# Patient Record
Sex: MALE | Race: WHITE | NOT HISPANIC OR LATINO | Employment: FULL TIME | URBAN - METROPOLITAN AREA
[De-identification: names, ages, dates, MRNs, and addresses within clinical notes are randomized per-mention and may not be internally consistent; named-entity substitution may affect disease eponyms.]

---

## 2019-01-02 ENCOUNTER — LAB (OUTPATIENT)
Dept: LAB | Facility: HOSPITAL | Age: 24
End: 2019-01-02

## 2019-01-02 ENCOUNTER — CONSULT (OUTPATIENT)
Dept: CARDIOLOGY | Facility: CLINIC | Age: 24
End: 2019-01-02

## 2019-01-02 VITALS
HEART RATE: 94 BPM | DIASTOLIC BLOOD PRESSURE: 86 MMHG | HEIGHT: 71 IN | OXYGEN SATURATION: 98 % | WEIGHT: 261.2 LBS | BODY MASS INDEX: 36.57 KG/M2 | SYSTOLIC BLOOD PRESSURE: 142 MMHG

## 2019-01-02 DIAGNOSIS — I49.3 PVC'S (PREMATURE VENTRICULAR CONTRACTIONS): ICD-10-CM

## 2019-01-02 DIAGNOSIS — I49.3 PVC'S (PREMATURE VENTRICULAR CONTRACTIONS): Primary | ICD-10-CM

## 2019-01-02 PROBLEM — R00.2 PALPITATIONS: Status: ACTIVE | Noted: 2019-01-02

## 2019-01-02 LAB
DEPRECATED FTI SERPL-MCNC: 2.1 TBI
T3RU NFR SERPL: 32.8 % (ref 23–37)
T4 SERPL-MCNC: 6.5 MCG/DL (ref 4.7–11.4)
TSH SERPL DL<=0.05 MIU/L-ACNC: 2.25 MIU/ML (ref 0.35–5.35)

## 2019-01-02 PROCEDURE — 84479 ASSAY OF THYROID (T3 OR T4): CPT

## 2019-01-02 PROCEDURE — 99243 OFF/OP CNSLTJ NEW/EST LOW 30: CPT | Performed by: PHYSICIAN ASSISTANT

## 2019-01-02 PROCEDURE — 36415 COLL VENOUS BLD VENIPUNCTURE: CPT

## 2019-01-02 PROCEDURE — 84443 ASSAY THYROID STIM HORMONE: CPT

## 2019-01-02 PROCEDURE — 93000 ELECTROCARDIOGRAM COMPLETE: CPT | Performed by: PHYSICIAN ASSISTANT

## 2019-01-02 PROCEDURE — 84436 ASSAY OF TOTAL THYROXINE: CPT

## 2019-01-02 RX ORDER — FLUTICASONE PROPIONATE 50 MCG
2 SPRAY, SUSPENSION (ML) NASAL AS NEEDED
COMMUNITY
End: 2021-10-25

## 2019-01-02 NOTE — PROGRESS NOTES
"[facility]  INITIAL OFFICE CONSULT      Matthew Pineda  1995  PCP: Saundra Emery PA    SUBJECTIVE:   Matthew Pineda is a 23 y.o. male seen for a consultation visit regarding the following: , Abnormal EKG, PVC's    Chief Complaint:   Abnormal EKG, PVC's    Consultation is requested by ALBANIA Cruz for evaluation of Abnormal ECG; Chest Pain; and Palpitations    Problem List:  1. PVC's  2. Mild Obesity  4. Elevated BP    History:  The patient is a 23-year-old white male seen in consultation today regarding abnormal EKG.  He denies any previous cardiac history.  He states that he has a physically demanding job working for the [employer] disposing of [employer] and [employer].  In order to do this job he has to have a high level of training.  He also requires routine physicals on a regular basis.  He reports that his job sometimes can be physically demanding that also sedentary at times.  He denies any chest pain or shortness of breath with physical activity suggesting angina pectoris.  He denies any syncope events.  He denies any heart failure symptoms.  He states that he presented to his physician at the [employer] for routine physical exam as noted his pulse was \"irregular\".  Patient was placed on a telemtry monitor and this revealed bigeminy PVCs.  He states during his physical exam he is unaware of any extra beats and had no symptoms of palpitations.  He then was referred to his primary care provider performed another EKG revealing normal sinus rhythm no significant arrhythmias.  He states routine lab work was obtained with his primary care provider which we do not have records of.  He states he was told that everything was \"normal\".  He denies having history of high blood pressure buthe has a family history of high blood pressure.  In view of the abnormal EKG revealing PVCs referred to our office for further evaluation.       Cardiac PMH: (Old records have been reviewed and " "summarized below)        Past Medical History, Past Surgical History, Family history, Social History, and Medications were all reviewed with the patient today and updated as necessary.     Current Outpatient Medications   Medication Sig Dispense Refill   • fluticasone (FLONASE) 50 MCG/ACT nasal spray 2 sprays into the nostril(s) as directed by provider As Needed.       No current facility-administered medications for this visit.      No Known Allergies      History reviewed. No pertinent past medical history.  History reviewed. No pertinent surgical history.  Family History   Problem Relation Age of Onset   • Hypertension Father      Social History     Tobacco Use   • Smoking status: Never Smoker   • Smokeless tobacco: Never Used   Substance Use Topics   • Alcohol use: Yes     Comment: weekly       ROS:  Review of Symptoms:  General: no recent weight loss/gain, weakness or fatigue  Skin: no rashes, lumps, or other skin changes  HEENT: no dizziness, lightheadedness, or vision changes  Respiratory: no cough or hemoptysis  Cardiovascular: no palpitations, and tachycardia  Gastrointestinal: no black/tarry stools or diarrhea  Urinary: no change in frequency or urgency  Peripheral Vascular: no claudication or leg cramps  Musculoskeletal: no muscle or joint pain/stiffness  Psychiatric: no depression or excessive stress  Neurological: no sensory or motor loss, no syncope  Hematologic: no anemia, easy bruising or bleeding  Endocrine: no thyroid problems, nor heat or cold intolerance         PHYSICAL EXAM:   Ht 180.3 cm (71\")   Wt 118 kg (261 lb 3.2 oz)   BMI 36.43 kg/m²      Wt Readings from Last 5 Encounters:   01/02/19 118 kg (261 lb 3.2 oz)     BP Readings from Last 5 Encounters:   No data found for BP       General-Well Nourished, Well developed  Eyes - PERRLA  Neck- supple, No mass  CV- regular rate and rhythm, no MRG  Lung- clear bilaterally  Abd- soft, +BS  Musc/skel - Norm strength and range of motion  Skin- warm " and dry  Neuro - Alert & Oriented x 3, appropriate mood.    Medical problems and test results were reviewed with the patient today.     No results found for this or any previous visit.      No results found for: CHOL, HDL, HDLC, LDL, LDLC, VLDL    EKG:  (EKG/Tracing has been independently visualized by me and summarized below)      ECG 12 Lead  Date/Time: 1/2/2019 9:16 PM  Performed by: Yohan Hernandez PA  Authorized by: Yohan Hernandez PA   Comparison: not compared with previous ECG   Rhythm: sinus rhythm  Ectopy: PVCs and unifocal PVCs  Conduction: conduction normal  ST Segments: ST segments normal  T Waves: T waves normal  Other: no other findings  Clinical impression: abnormal ECG            ASSESSMENT   1. Asymptomatic PVC's:  EKG today revealing occasional PVC's.  Tele strips from [facility] revealing Bigeminy .  Patient asymptomatic at that time.   2. Mild Obesity  3. Elevated BP    PLAN  · The patient denies having any symptoms of palpitations.  He denies ever having any syncope events.  He denies any chest pain suggesting angina pectoris.  We will obtain a 24-hour monitor to determine PVC burden.  In addition we will perform echocardiogram to review for any structural heart disease.  · We recommended that he limit caffeine intake improved sleep habits continue diet exercise monitor home blood pressure and limit sodium intake.    · Thyroid panel done today.  Obtain laboratory data from primary care provider.  · Return for follow-up in one month or sooner if any change in symptoms.         Cardiology/Electrophysiology  01/02/19  9:31 AM  Will Mary KIM

## 2019-01-04 ENCOUNTER — TELEPHONE (OUTPATIENT)
Dept: CARDIOLOGY | Facility: CLINIC | Age: 24
End: 2019-01-04

## 2019-01-04 NOTE — TELEPHONE ENCOUNTER
----- Message from ALBANIA García sent at 1/2/2019  9:25 PM EST -----  Need labs form PCP            I called PCP and requested the most recent labs. They should be faxing them today.

## 2019-02-08 ENCOUNTER — OFFICE VISIT (OUTPATIENT)
Dept: CARDIOLOGY | Facility: CLINIC | Age: 24
End: 2019-02-08

## 2019-02-08 ENCOUNTER — HOSPITAL ENCOUNTER (OUTPATIENT)
Dept: CARDIOLOGY | Facility: HOSPITAL | Age: 24
Discharge: HOME | End: 2019-02-08
Admitting: PHYSICIAN ASSISTANT

## 2019-02-08 VITALS
HEART RATE: 101 BPM | SYSTOLIC BLOOD PRESSURE: 140 MMHG | DIASTOLIC BLOOD PRESSURE: 90 MMHG | WEIGHT: 258 LBS | BODY MASS INDEX: 36.12 KG/M2 | OXYGEN SATURATION: 98 % | HEIGHT: 71 IN

## 2019-02-08 VITALS
BODY MASS INDEX: 36.54 KG/M2 | HEIGHT: 71 IN | SYSTOLIC BLOOD PRESSURE: 153 MMHG | WEIGHT: 261 LBS | DIASTOLIC BLOOD PRESSURE: 93 MMHG

## 2019-02-08 DIAGNOSIS — I49.3 PVC'S (PREMATURE VENTRICULAR CONTRACTIONS): ICD-10-CM

## 2019-02-08 DIAGNOSIS — R00.2 PALPITATIONS: ICD-10-CM

## 2019-02-08 DIAGNOSIS — G47.33 OSA (OBSTRUCTIVE SLEEP APNEA): Primary | ICD-10-CM

## 2019-02-08 LAB
BH CV ECHO MEAS - AO MAX PG (FULL): 4.7 MMHG
BH CV ECHO MEAS - AO MAX PG: 7 MMHG
BH CV ECHO MEAS - AO MEAN PG (FULL): 2.5 MMHG
BH CV ECHO MEAS - AO MEAN PG: 3.8 MMHG
BH CV ECHO MEAS - AO ROOT AREA (BSA CORRECTED): 1.3
BH CV ECHO MEAS - AO ROOT AREA: 6.9 CM^2
BH CV ECHO MEAS - AO ROOT DIAM: 3 CM
BH CV ECHO MEAS - AO V2 MAX: 132.5 CM/SEC
BH CV ECHO MEAS - AO V2 MEAN: 87.9 CM/SEC
BH CV ECHO MEAS - AO V2 VTI: 24.4 CM
BH CV ECHO MEAS - ASC AORTA: 2.9 CM
BH CV ECHO MEAS - AVA(I,A): 2.3 CM^2
BH CV ECHO MEAS - AVA(I,D): 2.3 CM^2
BH CV ECHO MEAS - AVA(V,A): 2.3 CM^2
BH CV ECHO MEAS - AVA(V,D): 2.3 CM^2
BH CV ECHO MEAS - BSA(HAYCOCK): 2.5 M^2
BH CV ECHO MEAS - BSA: 2.4 M^2
BH CV ECHO MEAS - BZI_BMI: 36.4 KILOGRAMS/M^2
BH CV ECHO MEAS - BZI_METRIC_HEIGHT: 180.3 CM
BH CV ECHO MEAS - BZI_METRIC_WEIGHT: 118.4 KG
BH CV ECHO MEAS - EDV(CUBED): 123.2 ML
BH CV ECHO MEAS - EDV(TEICH): 116.9 ML
BH CV ECHO MEAS - EF(CUBED): 79.8 %
BH CV ECHO MEAS - EF(TEICH): 72 %
BH CV ECHO MEAS - ESV(CUBED): 24.9 ML
BH CV ECHO MEAS - ESV(TEICH): 32.8 ML
BH CV ECHO MEAS - FS: 41.3 %
BH CV ECHO MEAS - IVS/LVPW: 0.93
BH CV ECHO MEAS - IVSD: 0.81 CM
BH CV ECHO MEAS - LA DIMENSION: 3.3 CM
BH CV ECHO MEAS - LA/AO: 1.1
BH CV ECHO MEAS - LAD MAJOR: 4.6 CM
BH CV ECHO MEAS - LAT PEAK E' VEL: 15.8 CM/SEC
BH CV ECHO MEAS - LATERAL E/E' RATIO: 5.4
BH CV ECHO MEAS - LV MASS(C)D: 144.1 GRAMS
BH CV ECHO MEAS - LV MASS(C)DI: 61 GRAMS/M^2
BH CV ECHO MEAS - LV MAX PG: 2.3 MMHG
BH CV ECHO MEAS - LV MEAN PG: 1.3 MMHG
BH CV ECHO MEAS - LV V1 MAX: 76.5 CM/SEC
BH CV ECHO MEAS - LV V1 MEAN: 52.1 CM/SEC
BH CV ECHO MEAS - LV V1 VTI: 14.3 CM
BH CV ECHO MEAS - LVIDD: 5 CM
BH CV ECHO MEAS - LVIDS: 2.9 CM
BH CV ECHO MEAS - LVOT AREA (M): 3.8 CM^2
BH CV ECHO MEAS - LVOT AREA: 4 CM^2
BH CV ECHO MEAS - LVOT DIAM: 2.2 CM
BH CV ECHO MEAS - LVPWD: 0.87 CM
BH CV ECHO MEAS - MED PEAK E' VEL: 12.3 CM/SEC
BH CV ECHO MEAS - MEDIAL E/E' RATIO: 7
BH CV ECHO MEAS - MV A MAX VEL: 65.6 CM/SEC
BH CV ECHO MEAS - MV DEC TIME: 0.13 SEC
BH CV ECHO MEAS - MV E MAX VEL: 87.4 CM/SEC
BH CV ECHO MEAS - MV E/A: 1.3
BH CV ECHO MEAS - PA ACC SLOPE: 716.4 CM/SEC^2
BH CV ECHO MEAS - PA ACC TIME: 0.14 SEC
BH CV ECHO MEAS - PA MAX PG: 7.3 MMHG
BH CV ECHO MEAS - PA PR(ACCEL): 16.1 MMHG
BH CV ECHO MEAS - PA V2 MAX: 134.7 CM/SEC
BH CV ECHO MEAS - SI(AO): 71.5 ML/M^2
BH CV ECHO MEAS - SI(CUBED): 41.6 ML/M^2
BH CV ECHO MEAS - SI(LVOT): 24 ML/M^2
BH CV ECHO MEAS - SI(TEICH): 35.6 ML/M^2
BH CV ECHO MEAS - SV(AO): 168.9 ML
BH CV ECHO MEAS - SV(CUBED): 98.3 ML
BH CV ECHO MEAS - SV(LVOT): 56.6 ML
BH CV ECHO MEAS - SV(TEICH): 84.1 ML
BH CV ECHO MEAS - TAPSE (>1.6): 2 CM2
BH CV ECHO MEAS - TV MAX PG: 1.2 MMHG
BH CV ECHO MEAS - TV V2 MAX: 55.3 CM/SEC
BH CV ECHO MEASUREMENTS AVERAGE E/E' RATIO: 6.22
BH CV XLRA - RV BASE: 3.4 CM
BH CV XLRA - RV LENGTH: 6.7 CM
BH CV XLRA - RV MID: 2.9 CM
BH CV XLRA - TDI S': 17 CM/SEC
LV EF 2D ECHO EST: 55 %

## 2019-02-08 PROCEDURE — 99213 OFFICE O/P EST LOW 20 MIN: CPT | Performed by: PHYSICIAN ASSISTANT

## 2019-02-08 PROCEDURE — 93306 TTE W/DOPPLER COMPLETE: CPT

## 2019-02-08 PROCEDURE — 93306 TTE W/DOPPLER COMPLETE: CPT | Performed by: INTERNAL MEDICINE

## 2019-04-01 ENCOUNTER — CONSULT (OUTPATIENT)
Dept: SLEEP MEDICINE | Facility: HOSPITAL | Age: 24
End: 2019-04-01

## 2019-04-01 VITALS
OXYGEN SATURATION: 98 % | DIASTOLIC BLOOD PRESSURE: 84 MMHG | BODY MASS INDEX: 35.08 KG/M2 | HEART RATE: 112 BPM | SYSTOLIC BLOOD PRESSURE: 153 MMHG | WEIGHT: 259 LBS | HEIGHT: 72 IN

## 2019-04-01 DIAGNOSIS — E66.9 OBESITY (BMI 30-39.9): ICD-10-CM

## 2019-04-01 DIAGNOSIS — R06.83 SNORING: ICD-10-CM

## 2019-04-01 DIAGNOSIS — G47.10 HYPERSOMNIA: ICD-10-CM

## 2019-04-01 DIAGNOSIS — G47.33 OSA (OBSTRUCTIVE SLEEP APNEA): ICD-10-CM

## 2019-04-01 PROCEDURE — 99244 OFF/OP CNSLTJ NEW/EST MOD 40: CPT | Performed by: INTERNAL MEDICINE

## 2019-04-01 NOTE — PROGRESS NOTES
Matthew Pineda Jr. is a 23 y.o. male.   Chief Complaint   Patient presents with   • Sleeping Problem       HPI     23 y.o. male seen in consultation at the request of Yohan Hernandez PA for evaluation of the above.     He is required to get a monthly medical evaluation at his work.  He had an EKG which revealed frequent PVCs.  He has been completely asymptomatic from the standpoint of palpitations.  He saw cardiology and had a negative workup other than for isolated PVCs.  A history of fatigue and nonrestorative sleep was elicited and he was referred here for a further evaluation.    He does admit to daytime somnolence but says this is much improved recently.  He works varying shifts including night and day shifts and recently moved to be closer to work and is able to get more sleep and thinks this is helped a lot.  He still admits to occasional fatigue/sleepiness.  He has been told he snores loudly at night.  He has had a significant recent weight gain.    Further details are as follows:    Kane Scale is: 2/24    Estimated average amount of sleep per night: 8  Number of times he wakes up at night: 0-1  Difficulty falling back asleep: no  It usually takes 5 minutes to go to sleep.  He feels sleepy upon waking up: no  Rotating or night shift work: yes    Drowsiness/Sleepiness:  He exhibits the following:  none    Snoring/Breathing:  He exhibits the following:  loud snoring  awoken with dry mouth    Reflux:  He describes the following:  none    Narcolepsy:  He exhibits the following:  feeling of paralysis while going to sleep or coming out of sleep    RLS/PLMs:  He describes the following:  discomfort in legs with an urge to move them    Insomnia:  He describes the following:  none    Parasomnia:  He exhibits the following:  none    Weight:  Weight change in the last year:  gain: 30 lbs      The patient's relevant past medical, surgical, family, and social history reviewed and updated in Epic as  "appropriate.    Current medications are:   Current Outpatient Medications:   •  fluticasone (FLONASE) 50 MCG/ACT nasal spray, 2 sprays into the nostril(s) as directed by provider As Needed., Disp: , Rfl:   •  metoprolol tartrate (LOPRESSOR) 25 MG tablet, Take 1 tablet by mouth 2 (Two) Times a Day., Disp: 60 tablet, Rfl: 11.    Review of Systems    Review of Systems  ROS documented in patient questionnaire ×12 systems.  Reviewed with patient.  Otherwise negative except as noted in HPI.    Physical Exam    Blood pressure 153/84, pulse 112, height 181.6 cm (71.5\"), weight 117 kg (259 lb), SpO2 98 %. Body mass index is 35.62 kg/m².    Physical Exam   Constitutional: He is oriented to person, place, and time. He appears well-developed and well-nourished.   HENT:   Head: Normocephalic and atraumatic.   Nose: Nose normal.   Mouth/Throat: Oropharynx is clear and moist. No oropharyngeal exudate.   Large tonsils and class II airway   Eyes: Conjunctivae are normal. No scleral icterus.   Neck: No tracheal deviation present. No thyromegaly present.   Cardiovascular: Normal rate and regular rhythm. Exam reveals no gallop and no friction rub.   No murmur heard.  Pulmonary/Chest: Effort normal. No respiratory distress. He has no wheezes. He has no rales.   Musculoskeletal: He exhibits no edema or deformity.   Neurological: He is alert and oriented to person, place, and time.   Skin: Skin is warm and dry. No rash noted.   Psychiatric: He has a normal mood and affect. His behavior is normal.   Nursing note and vitals reviewed.      ASSESSMENT:    Problem List Items Addressed This Visit        Pulmonary Problems    KELLY (obstructive sleep apnea) - suspected    Snoring       Other    Hypersomnia    Relevant Orders    Home Sleep Study    Obesity (BMI 30-39.9)          Evidence of obstructive sleep apnea based upon snoring and a question of nonrestorative sleep.  He thinks the symptoms have been improved somewhat by getting more sleep at " night but there is still a question of untreated obstructive sleep apnea which I think is valid and particularly salient when you consider his recent unexplained PVCs.    PLAN:    1. Home sleep apnea test  2. I discussed the possible diagnosis of obstructive sleep apnea and its potential effect on his symptoms and potential relationship to his cardiac findings  3. We discussed the diagnostic process as well as potential treatments for obstructive sleep apnea in detail  4. Long-term weight loss recommended  5. Close sleep center follow-up    I have reviewed the results of my evaluation and impression and discussed my recommendations in detail with the patient.      Signed by  Chris Ornelas MD    April 1, 2019      CC: Saundra Emery PA [place]udd[place], William V, PA

## 2021-05-06 ENCOUNTER — HOSPITAL ENCOUNTER (EMERGENCY)
Facility: HOSPITAL | Age: 26
Discharge: HOME | End: 2021-05-06
Attending: EMERGENCY MEDICINE | Admitting: EMERGENCY MEDICINE
Payer: COMMERCIAL

## 2021-05-06 VITALS
WEIGHT: 293.2 LBS | BODY MASS INDEX: 41.97 KG/M2 | RESPIRATION RATE: 17 BRPM | HEART RATE: 83 BPM | HEIGHT: 70 IN | OXYGEN SATURATION: 100 % | DIASTOLIC BLOOD PRESSURE: 89 MMHG | TEMPERATURE: 97.4 F | SYSTOLIC BLOOD PRESSURE: 137 MMHG

## 2021-05-06 DIAGNOSIS — T15.92XA FOREIGN BODY OF LEFT EYE, INITIAL ENCOUNTER: Primary | ICD-10-CM

## 2021-05-06 PROCEDURE — 99283 EMERGENCY DEPT VISIT LOW MDM: CPT

## 2021-05-06 RX ORDER — TETRACAINE HYDROCHLORIDE 5 MG/ML
2 SOLUTION OPHTHALMIC ONCE
Status: COMPLETED | OUTPATIENT
Start: 2021-05-06 | End: 2021-05-06

## 2021-05-06 RX ORDER — ERYTHROMYCIN 5 MG/G
OINTMENT OPHTHALMIC EVERY 6 HOURS
Qty: 3.5 G | Refills: 0 | Status: SHIPPED | OUTPATIENT
Start: 2021-05-06 | End: 2021-05-11

## 2021-05-06 RX ADMIN — TETRACAINE HYDROCHLORIDE 2 DROP: 5 SOLUTION OPHTHALMIC at 18:05

## 2021-05-06 RX ADMIN — FLUORESCEIN SODIUM 1 STRIP: 1 STRIP OPHTHALMIC at 18:05

## 2021-05-06 NOTE — ED PROVIDER NOTES
Subjective   25-year-old male presenting with eye injury.  He states that yesterday he was weed eating, was not wearing protective goggles, felt something fly into his left eye.  All night and today he has had watery discharge from the eye.  Feels like there is something stuck in there.  Denies any vision changes or other complaints.  He does not wear contacts.          Review of Systems   Constitutional: Negative.    HENT: Negative.    Eyes: Positive for pain, discharge and redness. Negative for photophobia and visual disturbance.   Respiratory: Negative.    Cardiovascular: Negative.    Gastrointestinal: Negative.    Genitourinary: Negative.    Musculoskeletal: Negative.    Skin: Negative.    Neurological: Negative.    Psychiatric/Behavioral: Negative.        History reviewed. No pertinent past medical history.    No Known Allergies    History reviewed. No pertinent surgical history.    Family History   Problem Relation Age of Onset   • Hypertension Father        Social History     Socioeconomic History   • Marital status: Single     Spouse name: Not on file   • Number of children: Not on file   • Years of education: Not on file   • Highest education level: Not on file   Tobacco Use   • Smoking status: Never Smoker   • Smokeless tobacco: Never Used   Substance and Sexual Activity   • Alcohol use: Yes     Comment: weekly   • Drug use: No           Objective   Physical Exam  Vitals reviewed.   Constitutional:       General: He is not in acute distress.     Appearance: Normal appearance. He is not ill-appearing, toxic-appearing or diaphoretic.   HENT:      Head: Normocephalic and atraumatic.      Right Ear: External ear normal.      Left Ear: External ear normal.      Nose: Nose normal.      Mouth/Throat:      Mouth: Mucous membranes are moist.      Pharynx: Oropharynx is clear.   Eyes:      Extraocular Movements: Extraocular movements intact.      Pupils: Pupils are equal, round, and reactive to light.      Comments:  Small foreign body left upper eyelid, left conjunctival injection and tearing, no fluorescin uptake    Cardiovascular:      Rate and Rhythm: Normal rate and regular rhythm.      Pulses: Normal pulses.      Heart sounds: Normal heart sounds.   Pulmonary:      Effort: Pulmonary effort is normal. No respiratory distress.      Breath sounds: Normal breath sounds.   Abdominal:      General: Bowel sounds are normal. There is no distension.      Tenderness: There is no abdominal tenderness.   Musculoskeletal:         General: No swelling, tenderness or deformity. Normal range of motion.      Cervical back: Normal range of motion and neck supple.   Skin:     General: Skin is warm and dry.      Capillary Refill: Capillary refill takes less than 2 seconds.      Findings: No rash.   Neurological:      General: No focal deficit present.      Mental Status: He is alert and oriented to person, place, and time.   Psychiatric:         Mood and Affect: Mood normal.         Behavior: Behavior normal.         Foreign Body Removal - Ocular    Date/Time: 5/6/2021 6:21 PM  Performed by: Ney Castle MD  Authorized by: Ney Castle MD     Consent:     Consent obtained:  Verbal    Consent given by:  Patient    Risks discussed:  Bleeding, corneal damage, damage to surrounding structures, infection, incomplete removal, pain, visual impairment and worsening of condition    Alternatives discussed:  No treatment  Location:     Location:  L upper eyelid    Depth:  Superficial  Pre-procedure details:     Imaging:  None    Fluorescein exam: yes      Fluorescein uptake: no    Anesthesia (see MAR for exact dosages):     Local anesthetic:  Tetracaine drops  Procedure details:     Localization method:  Direct visualization and eyelid eversion    Removal mechanism:  Moist cotton swab    Foreign bodies recovered:  1    Description:  Piece of blade of grass    Intact foreign body removal: yes    Post-procedure details:      Confirmation:  No additional foreign bodies on visualization    Dressing:  Antibiotic ointment    Patient tolerance of procedure:  Tolerated well, no immediate complications               ED Course                                           MDM  Number of Diagnoses or Management Options  Foreign body of left eye, initial encounter  Diagnosis management comments: 25-year-old male with eye foreign body.  Well-developed, well-nourished young man in no distress with exam as above.  Did have a small blade of grass in the left upper eyelid that was removed under direct visualization without any complication. Will treat with erythromycin ointment.  We will have him follow-up with orthopedics if not improving.  He is comfortable with and understanding of the plan.    DDx: Eye foreign body      Final diagnoses:   Foreign body of left eye, initial encounter            Ney Castle MD  05/14/21 2343

## 2021-06-18 ENCOUNTER — OFFICE VISIT (OUTPATIENT)
Dept: INTERNAL MEDICINE | Facility: CLINIC | Age: 26
End: 2021-06-18

## 2021-06-18 VITALS
WEIGHT: 273 LBS | BODY MASS INDEX: 39.08 KG/M2 | SYSTOLIC BLOOD PRESSURE: 166 MMHG | DIASTOLIC BLOOD PRESSURE: 104 MMHG | HEIGHT: 70 IN | OXYGEN SATURATION: 98 % | TEMPERATURE: 97.8 F | HEART RATE: 116 BPM

## 2021-06-18 DIAGNOSIS — F41.9 ANXIETY: ICD-10-CM

## 2021-06-18 DIAGNOSIS — Z76.89 ENCOUNTER TO ESTABLISH CARE: Primary | ICD-10-CM

## 2021-06-18 DIAGNOSIS — R03.0 ELEVATED BLOOD PRESSURE READING IN OFFICE WITHOUT DIAGNOSIS OF HYPERTENSION: ICD-10-CM

## 2021-06-18 DIAGNOSIS — N52.8 OTHER MALE ERECTILE DYSFUNCTION: ICD-10-CM

## 2021-06-18 PROCEDURE — 99203 OFFICE O/P NEW LOW 30 MIN: CPT | Performed by: PHYSICIAN ASSISTANT

## 2021-06-18 RX ORDER — BUSPIRONE HYDROCHLORIDE 10 MG/1
10 TABLET ORAL 3 TIMES DAILY PRN
Qty: 90 TABLET | Refills: 2 | Status: SHIPPED | OUTPATIENT
Start: 2021-06-18 | End: 2021-07-12 | Stop reason: SDUPTHER

## 2021-06-18 RX ORDER — CETIRIZINE HYDROCHLORIDE 10 MG/1
10 TABLET ORAL DAILY
COMMUNITY
End: 2023-02-13

## 2021-06-18 RX ORDER — SILDENAFIL CITRATE 20 MG/1
TABLET ORAL
Qty: 30 TABLET | Refills: 1 | Status: SHIPPED | OUTPATIENT
Start: 2021-06-18 | End: 2023-02-13

## 2021-06-18 NOTE — PROGRESS NOTES
New Patient Office Visit      Patient Name: Matthew Pineda Jr.  : 1995   MRN: [id number]     Chief Complaint:    Chief Complaint   Patient presents with   • Establish Care     discuss ED       History of Present Illness: Matthew Pineda Jr. is a 26 y.o. male who is here today to establish care.     For the last 2 weeks he has had persistent erectile dysfunction. He reports he has felt anxious recently due to increased stress with work and buying a new home. No urinary hesitancy, frequency, urgency or dysuria. Similar issue a few years ago which seemed to be anxiety related and symptoms resolved with taking buspirone daily which he continued for over 1 year. He tried Viagra before Buspirone but the Viagra had no major effect.     He has been exercising for the last 3 weeks and has intentionally lost weight. No fatigue, night sweats, fever or adenopathy.      Blood pressure is elevated today. He denies known history of hypertension. He denies chest pain, dyspnea, orthopnea, palpitations, lower extremity edema, headaches, weakness, visual disturbances or confusion. His father has high blood pressure which may have been diagnosed in his 40s.     He has a physical with labs every year for work. He reports labs have been within normal.     Subjective         Past Medical History: History reviewed. No pertinent past medical history.    Past Surgical History: History reviewed. No pertinent surgical history.    Family History:   Family History   Problem Relation Age of Onset   • Hypertension Father        Social History:   Social History     Socioeconomic History   • Marital status: Single     Spouse name: Not on file   • Number of children: Not on file   • Years of education: Not on file   • Highest education level: Not on file   Tobacco Use   • Smoking status: Never Smoker   • Smokeless tobacco: Never Used   Substance and Sexual Activity   • Alcohol use: Yes     Comment: 2 x per week   • Drug use: No  "      Medications:     Current Outpatient Medications:   •  cetirizine (zyrTEC) 10 MG tablet, Take 10 mg by mouth Daily., Disp: , Rfl:   •  fluticasone (FLONASE) 50 MCG/ACT nasal spray, 2 sprays into the nostril(s) as directed by provider As Needed., Disp: , Rfl:   •  busPIRone (BUSPAR) 10 MG tablet, Take 1 tablet by mouth 3 (Three) Times a Day As Needed (anxiety)., Disp: 90 tablet, Rfl: 2  •  sildenafil (REVATIO) 20 MG tablet, Take 2-3 tablets by mouth 0.5-4 hours before needed up to once daily., Disp: 30 tablet, Rfl: 1    Allergies:   No Known Allergies    Objective     Physical Exam:   Vital Signs:   Vitals:    06/18/21 1049   BP: (!) 166/104   Pulse: 116   Temp: 97.8 °F (36.6 °C)   SpO2: 98%   Weight: 124 kg (273 lb)   Height: 177.8 cm (70\")     Body mass index is 39.17 kg/m².     Physical Exam  Vitals and nursing note reviewed.   Constitutional:       General: He is awake. He is not in acute distress.     Appearance: He is well-developed. He is morbidly obese. He is not ill-appearing, toxic-appearing or diaphoretic.      Interventions: Face mask in place.   HENT:      Head: Normocephalic and atraumatic.      Right Ear: External ear normal.      Left Ear: External ear normal.   Eyes:      General: No scleral icterus.     Extraocular Movements: Extraocular movements intact.      Conjunctiva/sclera: Conjunctivae normal.      Pupils: Pupils are equal, round, and reactive to light.   Cardiovascular:      Rate and Rhythm: Regular rhythm. Tachycardia present.      Heart sounds: Normal heart sounds. No murmur heard.   No friction rub. No gallop.    Pulmonary:      Effort: Pulmonary effort is normal. No respiratory distress.      Breath sounds: Normal breath sounds. No wheezing, rhonchi or rales.   Chest:      Chest wall: No tenderness.   Abdominal:      General: Bowel sounds are normal. There is no distension.      Palpations: Abdomen is soft. There is no mass.      Tenderness: There is no abdominal tenderness. There " is no right CVA tenderness, left CVA tenderness, guarding or rebound.      Hernia: No hernia is present.   Musculoskeletal:         General: No tenderness or deformity. Normal range of motion.      Cervical back: Normal range of motion and neck supple. No rigidity or tenderness.      Right lower leg: No edema.      Left lower leg: No edema.   Lymphadenopathy:      Cervical: No cervical adenopathy.   Skin:     General: Skin is warm and dry.      Capillary Refill: Capillary refill takes less than 2 seconds.      Coloration: Skin is not jaundiced or pale.      Findings: No erythema or rash.   Neurological:      General: No focal deficit present.      Mental Status: He is alert and oriented to person, place, and time.      Cranial Nerves: No cranial nerve deficit.      Sensory: No sensory deficit.      Motor: No abnormal muscle tone.      Coordination: Coordination normal.      Gait: Gait normal.      Deep Tendon Reflexes: Reflexes normal.   Psychiatric:         Attention and Perception: Attention and perception normal.         Mood and Affect: Affect normal. Mood is anxious. Mood is not depressed. Affect is not blunt, flat, angry, tearful or inappropriate.         Speech: Speech normal.         Behavior: Behavior normal. Behavior is cooperative.         Thought Content: Thought content normal.         Cognition and Memory: Cognition and memory normal.         Judgment: Judgment normal.           Assessment / Plan      Assessment/Plan:   Diagnoses and all orders for this visit:    1. Encounter to establish care (Primary)    2. Other male erectile dysfunction  -     Begin trial: Sildenafil (REVATIO) 20 MG tablet; Take 2-3 tablets by mouth 0.5-4 hours before needed up to once daily.  Dispense: 30 tablet; Refill: 1  Anxiety is felt to be the cause of recent erectile dysfunction as he has had similar symptoms in the past associated with anxiety.  Recheck in around 3 weeks.    3. Anxiety  -     Begin 3 times daily as needed:  busPIRone (BUSPAR) 10 MG tablet; Take 1 tablet by mouth 3 (Three) Times a Day As Needed (anxiety).  Dispense: 90 tablet; Refill: 2    4. Elevated blood pressure reading in office without diagnosis of hypertension  He is anxious today due to discussion of erectile dysfunction and being the first office visit in a new location.  As this is the first occurrence of elevated blood pressure we will not initiate treatment but rather will recheck in around 3 weeks.           Follow Up:   Return in about 3 weeks (around 7/9/2021) for Next scheduled follow up BP and anxiety.    Patient was given instructions and counseling regarding his condition or for health maintenance advice. Please see specific information pulled into the AVS if appropriate.     Angelica Vasquez PA-C  [facility] [facility]     Please note that portions of this note may have been completed with a voice recognition program. Efforts were made to edit the dictations, but occasionally words are mistranscribed.

## 2021-07-12 ENCOUNTER — OFFICE VISIT (OUTPATIENT)
Dept: INTERNAL MEDICINE | Facility: CLINIC | Age: 26
End: 2021-07-12

## 2021-07-12 VITALS
TEMPERATURE: 97.7 F | WEIGHT: 273 LBS | HEART RATE: 113 BPM | DIASTOLIC BLOOD PRESSURE: 90 MMHG | SYSTOLIC BLOOD PRESSURE: 130 MMHG | OXYGEN SATURATION: 99 % | BODY MASS INDEX: 39.08 KG/M2 | HEIGHT: 70 IN

## 2021-07-12 DIAGNOSIS — R03.0 ELEVATED BLOOD PRESSURE READING IN OFFICE WITHOUT DIAGNOSIS OF HYPERTENSION: Primary | ICD-10-CM

## 2021-07-12 DIAGNOSIS — F41.9 ANXIETY: ICD-10-CM

## 2021-07-12 DIAGNOSIS — N52.8 OTHER MALE ERECTILE DYSFUNCTION: ICD-10-CM

## 2021-07-12 PROCEDURE — 99214 OFFICE O/P EST MOD 30 MIN: CPT | Performed by: PHYSICIAN ASSISTANT

## 2021-07-12 RX ORDER — BUSPIRONE HYDROCHLORIDE 10 MG/1
10 TABLET ORAL 3 TIMES DAILY PRN
Qty: 90 TABLET | Refills: 5 | Status: SHIPPED | OUTPATIENT
Start: 2021-07-12 | End: 2021-07-16 | Stop reason: SDUPTHER

## 2021-07-12 NOTE — PROGRESS NOTES
Follow Up Office Visit      Patient Name: Matthew Pineda Jr.  : 1995   MRN: [id number]     Chief Complaint:    Chief Complaint   Patient presents with   • Follow-up     hypertension and anxiety       History of Present Illness: Matthew Pineda Jr. is a 26 y.o. male who is here today for follow up of elevated blood pressure reading, anxiety and erectile dysfunction.     One month ago we initiated a trial of sildenafil for erectile dysfunction. He did not get the prescription filled but feels ED has resolved on its own.     Blood pressure was very elevated 1 month ago when he established care here which he contributed to anxiety. He denied previous troubles with hypertension. He was prescribed Buspar to help with anxiety. He feels it has been helpful and has been taking it 3 times a day. Blood pressure is much improved today since anxiety has lessened.  He denies chest pain, dyspnea, orthopnea, palpitations, lower extremity edema, headaches, weakness, visual disturbances or confusion.         Subjective      I have reviewed and the following portions of the patient's history were updated as appropriate: past family history, past medical history, past social history, past surgical history and problem list.      Current Outpatient Medications:   •  busPIRone (BUSPAR) 10 MG tablet, Take 1 tablet by mouth 3 (Three) Times a Day As Needed (anxiety)., Disp: 90 tablet, Rfl: 5  •  cetirizine (zyrTEC) 10 MG tablet, Take 10 mg by mouth Daily., Disp: , Rfl:   •  fluticasone (FLONASE) 50 MCG/ACT nasal spray, 2 sprays into the nostril(s) as directed by provider As Needed., Disp: , Rfl:   •  sildenafil (REVATIO) 20 MG tablet, Take 2-3 tablets by mouth 0.5-4 hours before needed up to once daily., Disp: 30 tablet, Rfl: 1    No Known Allergies    Objective     Physical Exam:  Vital Signs:   Vitals:    21 1632   BP: 130/90   Pulse: 113   Temp: 97.7 °F (36.5 °C)   SpO2: 99%   Weight: 124 kg (273 lb)   Height:  "177.8 cm (70\")     Body mass index is 39.17 kg/m².    Physical Exam  Vitals and nursing note reviewed.   Constitutional:       General: He is not in acute distress.     Appearance: He is well-developed. He is obese. He is not ill-appearing, toxic-appearing or diaphoretic.   HENT:      Head: Normocephalic and atraumatic.      Right Ear: External ear normal.      Left Ear: External ear normal.   Eyes:      General: No scleral icterus.     Extraocular Movements: Extraocular movements intact.      Conjunctiva/sclera: Conjunctivae normal.      Pupils: Pupils are equal, round, and reactive to light.   Cardiovascular:      Rate and Rhythm: Normal rate and regular rhythm.      Heart sounds: Normal heart sounds. No murmur heard.   No friction rub. No gallop.    Pulmonary:      Effort: Pulmonary effort is normal. No respiratory distress.      Breath sounds: Normal breath sounds. No wheezing, rhonchi or rales.   Chest:      Chest wall: No tenderness.   Abdominal:      General: Bowel sounds are normal.      Palpations: Abdomen is soft.      Tenderness: There is no abdominal tenderness.   Musculoskeletal:         General: No tenderness or deformity. Normal range of motion.      Cervical back: Normal range of motion and neck supple.      Right lower leg: No edema.      Left lower leg: No edema.   Skin:     General: Skin is warm and dry.      Capillary Refill: Capillary refill takes less than 2 seconds.      Coloration: Skin is not jaundiced or pale.      Findings: No erythema or rash.   Neurological:      Mental Status: He is alert and oriented to person, place, and time.      Cranial Nerves: No cranial nerve deficit.      Sensory: No sensory deficit.      Motor: No abnormal muscle tone.      Coordination: Coordination normal.      Deep Tendon Reflexes: Reflexes normal.   Psychiatric:         Mood and Affect: Mood normal.         Behavior: Behavior normal.         Thought Content: Thought content normal.         Judgment: " Judgment normal.               Assessment / Plan      Assessment/Plan:   Diagnoses and all orders for this visit:    1. Elevated blood pressure reading in office without diagnosis of hypertension (Primary)  Blood pressure has improved significantly today compared to 1 month ago.  He was encouraged to continue diet and exercise.    2. Anxiety  -     Stable, continue: busPIRone (BUSPAR) 10 MG tablet; Take 1 tablet by mouth 3 (Three) Times a Day As Needed (anxiety).  Dispense: 90 tablet; Refill: 5    3. Other male erectile dysfunction  Resolved once anxiety improved.           I spent 30 minutes caring for Matthew on this date of service. This time includes time spent by me in the following activities:preparing for the visit, performing a medically appropriate examination and/or evaluation , counseling and educating the patient/family/caregiver, ordering medications, tests, or procedures and documenting information in the medical record    Follow Up:   Return in about 6 months (around 1/12/2022) for Annual physical.    Patient was given instructions and counseling regarding his condition or for health maintenance advice. Please see specific information pulled into the AVS if appropriate.     Angelica Vasquez PA-C  [facility]     Please note that portions of this note may have been completed with a voice recognition program. Efforts were made to edit the dictations, but occasionally words are mistranscribed.

## 2021-07-16 DIAGNOSIS — F41.9 ANXIETY: ICD-10-CM

## 2021-07-16 RX ORDER — BUSPIRONE HYDROCHLORIDE 10 MG/1
10 TABLET ORAL 3 TIMES DAILY PRN
Qty: 270 TABLET | Refills: 0 | Status: SHIPPED | OUTPATIENT
Start: 2021-07-16 | End: 2021-12-09

## 2021-09-21 PROCEDURE — U0004 COV-19 TEST NON-CDC HGH THRU: HCPCS | Performed by: NURSE PRACTITIONER

## 2021-09-23 ENCOUNTER — TELEPHONE (OUTPATIENT)
Dept: URGENT CARE | Facility: CLINIC | Age: 26
End: 2021-09-23

## 2021-09-23 NOTE — TELEPHONE ENCOUNTER
Spoke with patient regarding positive COVID results. Will continue quarantine as directed through 9/25/21. He is much improved.    Agrees to follow up with PCP after quarantine and return to clinic if worsening symptoms or go to the ER if in any distress.

## 2021-12-09 DIAGNOSIS — F41.9 ANXIETY: ICD-10-CM

## 2021-12-09 RX ORDER — BUSPIRONE HYDROCHLORIDE 10 MG/1
10 TABLET ORAL 3 TIMES DAILY PRN
Qty: 270 TABLET | Refills: 0 | Status: SHIPPED | OUTPATIENT
Start: 2021-12-09 | End: 2023-02-13

## 2022-12-07 ENCOUNTER — E-VISIT (OUTPATIENT)
Dept: FAMILY MEDICINE CLINIC | Facility: TELEHEALTH | Age: 27
End: 2022-12-07
Payer: COMMERCIAL

## 2022-12-07 PROCEDURE — 99421 OL DIG E/M SVC 5-10 MIN: CPT | Performed by: NURSE PRACTITIONER

## 2022-12-07 NOTE — EXTERNAL PATIENT INSTRUCTIONS
Note   I have prescribed Azithromycin 1000mg one time dose. Follow up with your PCP for retesting   Diagnosis   Chlamydia exposure   My name is Brunilda Kary, and I'm a healthcare provider at [facility]. I've reviewed your interview. Since your partner has tested positive for chlamydia, that means you've also been exposed to it. For this reason, you'll need treatment.   The good news is that I can provide treatment for you now. Getting treatment as early as possible is important. It's the best way to stop the infection and prevent it from spreading. You're doing the right thing for your health, and I'm glad you reached out.   Medications   Your pharmacy   [facility]/pharmacy #[id number] [address] [address] [phone]     Prescription   Azithromycin (250mg): Take 4 tablets by mouth once for infection. This medication is an antibiotic. Take it exactly as directed.    To make sure your treatment succeeds, please follow these instructions:   - Finish all of your prescribed medication.   - Don't share your medication for chlamydia with anyone.   - Don't have sex until you and your partner(s) have finished treatment. I've prescribed a single dose of azithromycin for you. After taking the medication, wait 7 days before having sex.   - Tell all recent partners about your exposure. This includes anyone you've had sex with in the 60 days before your symptoms began. All sexual partners should seek care and treatment.   - Get tested for chlamydia about 3 months after you finish the medication. You need to get tested even if your partner was also treated. Repeat infection with chlamydia is common.   About your diagnosis   Chlamydia is a common sexually transmitted infection (STI) that affects both men and women.   Anyone can get chlamydia by having unprotected vaginal, anal, or oral sex with someone who has chlamydia. Some people with chlamydia never develop any symptoms, but they can still pass it on to their  sexual partners. That's why you need to get treatment if you've been exposed.   What to expect   If you follow this treatment plan, you should feel better in 3 to 5 days.   When to seek care   Call us at [phone]   with any sudden or unexpected symptoms.    A fever that measures over 103F or continues for more than 4 days    Shaking or chills    Severe pain in your abdomen (belly), pelvis, back, or side (flank)    The pain you feel when urinating becomes severe    Bloody discharge from your penis    Nausea or vomiting   Prevention   The only sure way to avoid STIs is to avoid vaginal, anal, or oral sex. If you have unprotected sex with someone who has chlamydia, you can get it again.   The following can lower your chances of getting an STI like chlamydia:    Only have sex with one partner who only has sex with you.    Make sure your partner has had recent negative STI test results.    Know that latex condoms can lower your risk, but they're not failproof.    Talk about STIs before having sex with a new partner. This way, you can make informed choices about your sexual health.   Get regular STI screenings if you have any of the following risk factors:    You've had a new partner in the past 2 months    You have more than one partner    You have a partner who has sex with other partners    You trade sex for money or drugs    You have sex with sex workers    You have sex with anonymous partners   Your provider   Your diagnosis was provided by Brunilda Preston, a member of your trusted care team at [facility].   If you have any questions, call us at [phone]  .

## 2022-12-07 NOTE — E-VISIT TREATED
Chief Complaint: Confirmed exposure to sexually transmitted infection   Patient introduction   Patient is 27-year-old male with confirmed exposure to chlamydia and an STI other than gonorrhea, chlamydia, or trichomoniasis.   No history of STI treatment in last 30 days.   No HIV/AIDS diagnosis. Patient has sex with women.   Patient-submitted comments   Patient writes: would like to get antibiotic treatment asap .   General presentation   Symptoms include burning or itching around urinary meatus, dysuria, and new rash.   Fever: No fever.   Current medications   Not taking other medications or supplements.   Medication allergies   None.   Medication contraindication review   No known history QT prolongation, myasthenia, or uncorrected electrolyte abnormalities.   Has not taken disulfiram (Antabuse) within the last 2 weeks.   No known history of azithromycin-associated jaundice or hepatic impairment.   Past medical history   Immune conditions: No immunocompromising conditions. No history of cancer.   Assessment   Chlamydia exposure only. Ruled out: Gonorrhea exposure only, Trichomoniasis exposure only, and Combined gonorrhea and trichomoniasis exposure.   This diagnosis is based on review of patient interview responses and other available clinical information.   Plan   Medications:    azithromycin 250 mg tablet RX 250mg 4 tabs PO once 1d for infection. This medication is an antibiotic. Take it exactly as directed. Amount is 4 tab.   The patient's prescription will be sent to:   [facility]/pharmacy #[id number]   [address] [place] [address]   Phone: [phone]     Fax: [phone]   Education:    Condition and causes    Prevention    Treatment and self-care    When to call provider   ----------   Electronically signed by KIRSTY Velasco FNP-BC on 2022-12-07 at 06:02AM   ----------   Patient Interview Transcript:   This interview can help with confirmed exposure to chlamydia, gonorrhea, or trichomoniasis  "(\"trich\"). In other cases, you should speak with a provider to get care. Has your sexual partner tested positive for any of these sexually transmitted infections (STIs)? Select all that apply.    Chlamydia    A different STI   Not selected:    Gonorrhea    Trichomoniasis    No   This interview can treat confirmed exposure to chlamydia, gonorrhea, and/or trichomoniasis only. For exposure to another STI, you need to speak with a provider. Would you like to continue this interview and get treatment for confirmed exposure to chlamydia, gonorrhea, and/or trichomoniasis? Select one.    Yes   Not selected:    No, I need treatment for exposure to another STI   Although monkeypox isn't considered a sexually transmitted infection, it can be passed on during sex and intimate contact. Have you been exposed to monkeypox in the last 3 weeks? Select one.    No, not that I know of   Not selected:    Yes   Do you have sex with women, men, or both? Select one.    Women   Not selected:    Men    Both women and men   Have you been diagnosed with HIV/AIDS? Select one.    No   Not selected:    Yes   Have you been treated for an STI in the last 30 days? Select one.    No   Not selected:    Yes   Do you have any of these symptoms? Select all that apply.    Burning or itching around the opening of the penis    Pain or burning with urination or ejaculation    New rash   Not selected:    Anal discomfort, pain, itching, or discharge    Fever    Muscle aches or backache    Headache    Fatigue or exhaustion    Frequent urination    Pain or swelling in the testicles    Sore throat    Discharge from the penis    None of these   Are you taking any other medications, vitamins, or supplements? Select one.    No   Not selected:    Yes   Have you ever had an allergic or bad reaction to any medication? Select one.    No   Not selected:    Yes   Have you used the medication disulfiram (Antabuse) in the last 2 weeks? Select one.    No   Not selected:   "  Yes   Do you have any of these conditions that can affect the immune system? Scroll to see all options. Select all that apply.    None of these   Not selected:    History of bone marrow transplant    Chronic kidney disease    Chronic liver disease (including cirrhosis)    HIV/AIDS    Inflammatory bowel disease (Crohn's disease or ulcerative colitis)    Lupus    Moderate to severe plaque psoriasis    Multiple sclerosis    Rheumatoid arthritis    Sickle cell anemia    Alpha or beta thalassemia    History of solid organ transplant (kidney, liver, or heart)    History of spleen removal    An autoimmune disorder not listed here    A condition requiring treatment with long-term use of oral steroids (such as prednisone, prednisolone, or dexamethasone)   Have you ever been diagnosed with cancer? Select one.    No   Not selected:    Yes, I have cancer now    Yes, but I'm in remission   Do you have any of these conditions? Select all that apply.    None of the above   Not selected:    QT prolongation    Myasthenia gravis    Uncorrected electrolyte abnormalities, such as sodium or potassium   Have you ever had jaundice or liver problems as a result of taking azithromycin (Zithromax, Zmax)? Select all that apply.    No, not that I know of   Not selected:    Yes, jaundice    Yes, liver problems   Is there anything else you'd like to tell us about your STI exposure?    would like to get antibiotic treatment asap   ----------   Medical history   Medical history data does not currently exist for this patient.

## 2022-12-20 ENCOUNTER — E-VISIT (OUTPATIENT)
Dept: FAMILY MEDICINE CLINIC | Facility: TELEHEALTH | Age: 27
End: 2022-12-20

## 2022-12-20 NOTE — E-VISIT ESCALATED
Patient escalated   Provider Naila Lara chose to escalate patient to another level of care because: Patient needs a lab test   Patient was sent the following message:   Based on the information you've provided us, you need to take another step to get care. recently treated for chlamydia   What to do now:   Urgent Care   You should be seen within the next 24 hours.   Please go to a walk-in clinic or urgent care, or make an appointment to be seen within the next 24 hours.   You won't be charged for your eVisit. If you paid with a credit card, the charge will be reversed.   Chief Complaint: Confirmed exposure to sexually transmitted infection   Patient introduction   Patient is 27-year-old male with confirmed exposure to chlamydia and an STI other than gonorrhea, chlamydia, or trichomoniasis.   Patient has been treated for chlamydia in the last 30 days.   No HIV/AIDS diagnosis. Patient has sex with women.   Patient-submitted comments   Patient writes: Earlier this month i had engaged with someone who was confirmed positive for Chlamydia, i was careless and let the same thing happen again, this person also tested positive..   General presentation   Symptoms include burning or itching around urinary meatus, urinary frequency, and dysuria.   Fever: No fever.   Current medications   Not taking other medications or supplements.   Medication allergies   None.   Medication contraindication review   No known history QT prolongation, myasthenia, or uncorrected electrolyte abnormalities.   Has not taken disulfiram (Antabuse) within the last 2 weeks.   No known history of azithromycin-associated jaundice or hepatic impairment.   Past medical history   Immune conditions: No immunocompromising conditions. No history of cancer.   Assessment:   Patient determined to need a level of care not appropriate to be delivered through eVisit.   Plan:   Patient informed of need to seek in-person care   ----------   Electronically signed  "by KIRSTY Fischer on 2022-12-20 at 06:26AM   ----------   Patient Interview Transcript:   This interview can help with confirmed exposure to chlamydia, gonorrhea, or trichomoniasis (\"trich\"). In other cases, you should speak with a provider to get care. Has your sexual partner tested positive for any of these sexually transmitted infections (STIs)? Select all that apply.    Chlamydia    A different STI   Not selected:    Gonorrhea    Trichomoniasis    No   This interview can treat confirmed exposure to chlamydia, gonorrhea, and/or trichomoniasis only. For exposure to another STI, you need to speak with a provider. Would you like to continue this interview and get treatment for confirmed exposure to chlamydia, gonorrhea, and/or trichomoniasis? Select one.    Yes   Not selected:    No, I need treatment for exposure to another STI   Although monkeypox isn't considered a sexually transmitted infection, it can be passed on during sex and intimate contact. Have you been exposed to monkeypox in the last 3 weeks? Select one.    No, not that I know of   Not selected:    Yes   Do you have sex with women, men, or both? Select one.    Women   Not selected:    Men    Both women and men   Have you been diagnosed with HIV/AIDS? Select one.    No   Not selected:    Yes   Have you been treated for an STI in the last 30 days? Select one.    Yes   Not selected:    No   Which STIs were you treated for in the last 30 days? Select all that apply.    Chlamydia   Not selected:    Gonorrhea    Trichomoniasis    An STI not listed here   Do you have any of these symptoms? Select all that apply.    Burning or itching around the opening of the penis    Frequent urination    Pain or burning with urination or ejaculation   Not selected:    Anal discomfort, pain, itching, or discharge    Fever    Muscle aches or backache    Headache    Fatigue or exhaustion    Pain or swelling in the testicles    Sore throat    Discharge from the penis    New " rash    None of these   Are you taking any other medications, vitamins, or supplements? Select one.    No   Not selected:    Yes   Have you ever had an allergic or bad reaction to any medication? Select one.    No   Not selected:    Yes   Have you used the medication disulfiram (Antabuse) in the last 2 weeks? Select one.    No   Not selected:    Yes   Do you have any of these conditions that can affect the immune system? Scroll to see all options. Select all that apply.    None of these   Not selected:    History of bone marrow transplant    Chronic kidney disease    Chronic liver disease (including cirrhosis)    HIV/AIDS    Inflammatory bowel disease (Crohn's disease or ulcerative colitis)    Lupus    Moderate to severe plaque psoriasis    Multiple sclerosis    Rheumatoid arthritis    Sickle cell anemia    Alpha or beta thalassemia    History of solid organ transplant (kidney, liver, or heart)    History of spleen removal    An autoimmune disorder not listed here    A condition requiring treatment with long-term use of oral steroids (such as prednisone, prednisolone, or dexamethasone)   Have you ever been diagnosed with cancer? Select one.    No   Not selected:    Yes, I have cancer now    Yes, but I'm in remission   Do you have any of these conditions? Select all that apply.    None of the above   Not selected:    QT prolongation    Myasthenia gravis    Uncorrected electrolyte abnormalities, such as sodium or potassium   Have you ever had jaundice or liver problems as a result of taking azithromycin (Zithromax, Zmax)? Select all that apply.    No, not that I know of   Not selected:    Yes, jaundice    Yes, liver problems   Is there anything else you'd like to tell us about your STI exposure?    Earlier this month i had engaged with someone who was confirmed positive for Chlamydia, i was careless and let the same thing happen again, this person also tested positive.   ----------   Medical history   Medical history  data does not currently exist for this patient.

## 2023-02-13 ENCOUNTER — OFFICE VISIT (OUTPATIENT)
Dept: INTERNAL MEDICINE | Facility: CLINIC | Age: 28
End: 2023-02-13
Payer: COMMERCIAL

## 2023-02-13 VITALS
DIASTOLIC BLOOD PRESSURE: 93 MMHG | OXYGEN SATURATION: 99 % | HEART RATE: 91 BPM | WEIGHT: 279.4 LBS | SYSTOLIC BLOOD PRESSURE: 130 MMHG | TEMPERATURE: 97.4 F | RESPIRATION RATE: 18 BRPM | BODY MASS INDEX: 39.11 KG/M2 | HEIGHT: 71 IN

## 2023-02-13 DIAGNOSIS — Z13.29 SCREENING FOR ENDOCRINE, NUTRITIONAL, METABOLIC AND IMMUNITY DISORDER: ICD-10-CM

## 2023-02-13 DIAGNOSIS — Z13.21 SCREENING FOR ENDOCRINE, NUTRITIONAL, METABOLIC AND IMMUNITY DISORDER: ICD-10-CM

## 2023-02-13 DIAGNOSIS — E66.01 CLASS 2 SEVERE OBESITY DUE TO EXCESS CALORIES WITH SERIOUS COMORBIDITY AND BODY MASS INDEX (BMI) OF 38.0 TO 38.9 IN ADULT: ICD-10-CM

## 2023-02-13 DIAGNOSIS — Z00.00 PHYSICAL EXAM, ANNUAL: Primary | ICD-10-CM

## 2023-02-13 DIAGNOSIS — Z11.3 SCREENING FOR STD (SEXUALLY TRANSMITTED DISEASE): ICD-10-CM

## 2023-02-13 DIAGNOSIS — Z13.0 SCREENING FOR ENDOCRINE, NUTRITIONAL, METABOLIC AND IMMUNITY DISORDER: ICD-10-CM

## 2023-02-13 DIAGNOSIS — R03.0 ELEVATED BLOOD PRESSURE READING IN OFFICE WITHOUT DIAGNOSIS OF HYPERTENSION: ICD-10-CM

## 2023-02-13 DIAGNOSIS — Z13.228 SCREENING FOR ENDOCRINE, NUTRITIONAL, METABOLIC AND IMMUNITY DISORDER: ICD-10-CM

## 2023-02-13 PROCEDURE — 96127 BRIEF EMOTIONAL/BEHAV ASSMT: CPT | Performed by: NURSE PRACTITIONER

## 2023-02-13 PROCEDURE — 99395 PREV VISIT EST AGE 18-39: CPT | Performed by: NURSE PRACTITIONER

## 2023-02-14 LAB
ALBUMIN SERPL-MCNC: 4.7 G/DL (ref 3.5–5.2)
ALBUMIN/GLOB SERPL: 1.4 G/DL
ALP SERPL-CCNC: 68 U/L (ref 39–117)
ALT SERPL-CCNC: 45 U/L (ref 1–41)
AST SERPL-CCNC: 25 U/L (ref 1–40)
BASOPHILS # BLD AUTO: 0.05 10*3/MM3 (ref 0–0.2)
BASOPHILS NFR BLD AUTO: 0.6 % (ref 0–1.5)
BILIRUB SERPL-MCNC: 0.4 MG/DL (ref 0–1.2)
BUN SERPL-MCNC: 17 MG/DL (ref 6–20)
BUN/CREAT SERPL: 18.9 (ref 7–25)
CALCIUM SERPL-MCNC: 9.8 MG/DL (ref 8.6–10.5)
CHLORIDE SERPL-SCNC: 104 MMOL/L (ref 98–107)
CHOLEST SERPL-MCNC: 168 MG/DL (ref 0–200)
CO2 SERPL-SCNC: 28.1 MMOL/L (ref 22–29)
CREAT SERPL-MCNC: 0.9 MG/DL (ref 0.76–1.27)
EGFRCR SERPLBLD CKD-EPI 2021: 119.3 ML/MIN/1.73
EOSINOPHIL # BLD AUTO: 0.21 10*3/MM3 (ref 0–0.4)
EOSINOPHIL NFR BLD AUTO: 2.7 % (ref 0.3–6.2)
ERYTHROCYTE [DISTWIDTH] IN BLOOD BY AUTOMATED COUNT: 12.4 % (ref 12.3–15.4)
GLOBULIN SER CALC-MCNC: 3.3 GM/DL
GLUCOSE SERPL-MCNC: 89 MG/DL (ref 65–99)
HAV IGM SERPL QL IA: NEGATIVE
HBA1C MFR BLD: 5.5 % (ref 4.8–5.6)
HBV CORE IGM SERPL QL IA: NEGATIVE
HBV SURFACE AG SERPL QL IA: NEGATIVE
HCT VFR BLD AUTO: 47.4 % (ref 37.5–51)
HCV AB SERPL QL IA: NORMAL
HCV IGG SERPL QL IA: NON REACTIVE
HDLC SERPL-MCNC: 40 MG/DL (ref 40–60)
HGB BLD-MCNC: 16.4 G/DL (ref 13–17.7)
HIV 1+2 AB+HIV1 P24 AG SERPL QL IA: NON REACTIVE
HSV1 IGG SER IA-ACNC: <0.91 INDEX (ref 0–0.9)
HSV2 IGG SER IA-ACNC: <0.91 INDEX (ref 0–0.9)
IMM GRANULOCYTES # BLD AUTO: 0.04 10*3/MM3 (ref 0–0.05)
IMM GRANULOCYTES NFR BLD AUTO: 0.5 % (ref 0–0.5)
LDLC SERPL CALC-MCNC: 107 MG/DL (ref 0–100)
LYMPHOCYTES # BLD AUTO: 2.97 10*3/MM3 (ref 0.7–3.1)
LYMPHOCYTES NFR BLD AUTO: 37.8 % (ref 19.6–45.3)
MCH RBC QN AUTO: 32.1 PG (ref 26.6–33)
MCHC RBC AUTO-ENTMCNC: 34.6 G/DL (ref 31.5–35.7)
MCV RBC AUTO: 92.8 FL (ref 79–97)
MONOCYTES # BLD AUTO: 0.64 10*3/MM3 (ref 0.1–0.9)
MONOCYTES NFR BLD AUTO: 8.1 % (ref 5–12)
NEUTROPHILS # BLD AUTO: 3.95 10*3/MM3 (ref 1.7–7)
NEUTROPHILS NFR BLD AUTO: 50.3 % (ref 42.7–76)
NRBC BLD AUTO-RTO: 0 /100 WBC (ref 0–0.2)
PLATELET # BLD AUTO: 220 10*3/MM3 (ref 140–450)
POTASSIUM SERPL-SCNC: 4.4 MMOL/L (ref 3.5–5.2)
PROT SERPL-MCNC: 8 G/DL (ref 6–8.5)
RBC # BLD AUTO: 5.11 10*6/MM3 (ref 4.14–5.8)
RPR SER QL: NON REACTIVE
SODIUM SERPL-SCNC: 141 MMOL/L (ref 136–145)
T4 FREE SERPL-MCNC: 1.13 NG/DL (ref 0.93–1.7)
TRIGL SERPL-MCNC: 116 MG/DL (ref 0–150)
TSH SERPL DL<=0.005 MIU/L-ACNC: 1.41 UIU/ML (ref 0.27–4.2)
VIT B12 SERPL-MCNC: 604 PG/ML (ref 211–946)
VLDLC SERPL CALC-MCNC: 21 MG/DL (ref 5–40)
WBC # BLD AUTO: 7.86 10*3/MM3 (ref 3.4–10.8)

## 2023-02-15 LAB
C TRACH RRNA SPEC QL NAA+PROBE: NEGATIVE
N GONORRHOEA RRNA SPEC QL NAA+PROBE: NEGATIVE
T VAGINALIS RRNA SPEC QL NAA+PROBE: NEGATIVE

## 2024-01-23 ENCOUNTER — ANESTHESIA EVENT (OUTPATIENT)
Dept: PERIOP | Facility: HOSPITAL | Age: 29
End: 2024-01-23
Payer: COMMERCIAL

## 2024-01-23 ENCOUNTER — ANESTHESIA (OUTPATIENT)
Dept: PERIOP | Facility: HOSPITAL | Age: 29
End: 2024-01-23
Payer: COMMERCIAL

## 2024-01-23 ENCOUNTER — HOSPITAL ENCOUNTER (EMERGENCY)
Facility: HOSPITAL | Age: 29
Discharge: HOME | End: 2024-01-23
Attending: STUDENT IN AN ORGANIZED HEALTH CARE EDUCATION/TRAINING PROGRAM | Admitting: SURGERY
Payer: COMMERCIAL

## 2024-01-23 ENCOUNTER — APPOINTMENT (OUTPATIENT)
Dept: CT IMAGING | Facility: HOSPITAL | Age: 29
End: 2024-01-23
Payer: COMMERCIAL

## 2024-01-23 VITALS
BODY MASS INDEX: 39.2 KG/M2 | SYSTOLIC BLOOD PRESSURE: 140 MMHG | RESPIRATION RATE: 16 BRPM | WEIGHT: 280 LBS | DIASTOLIC BLOOD PRESSURE: 81 MMHG | OXYGEN SATURATION: 99 % | HEIGHT: 71 IN | TEMPERATURE: 98.1 F | HEART RATE: 112 BPM

## 2024-01-23 DIAGNOSIS — K35.30 ACUTE APPENDICITIS WITH LOCALIZED PERITONITIS, WITHOUT PERFORATION, ABSCESS, OR GANGRENE: Primary | ICD-10-CM

## 2024-01-23 DIAGNOSIS — K37 APPENDICITIS, UNSPECIFIED APPENDICITIS TYPE: ICD-10-CM

## 2024-01-23 LAB
ALBUMIN SERPL-MCNC: 4.7 G/DL (ref 3.5–5.2)
ALBUMIN/GLOB SERPL: 1.2 G/DL
ALP SERPL-CCNC: 81 U/L (ref 39–117)
ALT SERPL W P-5'-P-CCNC: 40 U/L (ref 1–41)
ANION GAP SERPL CALCULATED.3IONS-SCNC: 12 MMOL/L (ref 5–15)
AST SERPL-CCNC: 24 U/L (ref 1–40)
B PARAPERT DNA SPEC QL NAA+PROBE: NOT DETECTED
B PERT DNA SPEC QL NAA+PROBE: NOT DETECTED
BACTERIA UR QL AUTO: ABNORMAL /HPF
BASOPHILS # BLD AUTO: 0.05 10*3/MM3 (ref 0–0.2)
BASOPHILS NFR BLD AUTO: 0.3 % (ref 0–1.5)
BILIRUB SERPL-MCNC: 0.6 MG/DL (ref 0–1.2)
BILIRUB UR QL STRIP: NEGATIVE
BUN SERPL-MCNC: 14 MG/DL (ref 6–20)
BUN/CREAT SERPL: 15.9 (ref 7–25)
C PNEUM DNA NPH QL NAA+NON-PROBE: NOT DETECTED
CALCIUM SPEC-SCNC: 9.2 MG/DL (ref 8.6–10.5)
CHLORIDE SERPL-SCNC: 99 MMOL/L (ref 98–107)
CLARITY UR: CLEAR
CO2 SERPL-SCNC: 26 MMOL/L (ref 22–29)
COLOR UR: YELLOW
CREAT SERPL-MCNC: 0.88 MG/DL (ref 0.76–1.27)
DEPRECATED RDW RBC AUTO: 37.8 FL (ref 37–54)
EGFRCR SERPLBLD CKD-EPI 2021: 119.4 ML/MIN/1.73
EOSINOPHIL # BLD AUTO: 0.09 10*3/MM3 (ref 0–0.4)
EOSINOPHIL NFR BLD AUTO: 0.6 % (ref 0.3–6.2)
ERYTHROCYTE [DISTWIDTH] IN BLOOD BY AUTOMATED COUNT: 11.8 % (ref 12.3–15.4)
FLUAV SUBTYP SPEC NAA+PROBE: NOT DETECTED
FLUBV RNA ISLT QL NAA+PROBE: NOT DETECTED
GLOBULIN UR ELPH-MCNC: 3.8 GM/DL
GLUCOSE SERPL-MCNC: 108 MG/DL (ref 65–99)
GLUCOSE UR STRIP-MCNC: NEGATIVE MG/DL
HADV DNA SPEC NAA+PROBE: NOT DETECTED
HCOV 229E RNA SPEC QL NAA+PROBE: NOT DETECTED
HCOV HKU1 RNA SPEC QL NAA+PROBE: NOT DETECTED
HCOV NL63 RNA SPEC QL NAA+PROBE: NOT DETECTED
HCOV OC43 RNA SPEC QL NAA+PROBE: NOT DETECTED
HCT VFR BLD AUTO: 45.9 % (ref 37.5–51)
HGB BLD-MCNC: 16.5 G/DL (ref 13–17.7)
HGB UR QL STRIP.AUTO: NEGATIVE
HMPV RNA NPH QL NAA+NON-PROBE: NOT DETECTED
HOLD SPECIMEN: NORMAL
HOLD SPECIMEN: NORMAL
HPIV1 RNA ISLT QL NAA+PROBE: NOT DETECTED
HPIV2 RNA SPEC QL NAA+PROBE: NOT DETECTED
HPIV3 RNA NPH QL NAA+PROBE: NOT DETECTED
HPIV4 P GENE NPH QL NAA+PROBE: NOT DETECTED
HYALINE CASTS UR QL AUTO: ABNORMAL /LPF
IMM GRANULOCYTES # BLD AUTO: 0.09 10*3/MM3 (ref 0–0.05)
IMM GRANULOCYTES NFR BLD AUTO: 0.6 % (ref 0–0.5)
KETONES UR QL STRIP: NEGATIVE
LEUKOCYTE ESTERASE UR QL STRIP.AUTO: NEGATIVE
LIPASE SERPL-CCNC: 22 U/L (ref 13–60)
LYMPHOCYTES # BLD AUTO: 2.21 10*3/MM3 (ref 0.7–3.1)
LYMPHOCYTES NFR BLD AUTO: 14.6 % (ref 19.6–45.3)
M PNEUMO IGG SER IA-ACNC: NOT DETECTED
MCH RBC QN AUTO: 31.4 PG (ref 26.6–33)
MCHC RBC AUTO-ENTMCNC: 35.9 G/DL (ref 31.5–35.7)
MCV RBC AUTO: 87.4 FL (ref 79–97)
MONOCYTES # BLD AUTO: 0.79 10*3/MM3 (ref 0.1–0.9)
MONOCYTES NFR BLD AUTO: 5.2 % (ref 5–12)
NEUTROPHILS NFR BLD AUTO: 11.94 10*3/MM3 (ref 1.7–7)
NEUTROPHILS NFR BLD AUTO: 78.7 % (ref 42.7–76)
NITRITE UR QL STRIP: NEGATIVE
NRBC BLD AUTO-RTO: 0 /100 WBC (ref 0–0.2)
PH UR STRIP.AUTO: 7.5 [PH] (ref 5–8)
PLATELET # BLD AUTO: 235 10*3/MM3 (ref 140–450)
PMV BLD AUTO: 9.7 FL (ref 6–12)
POTASSIUM SERPL-SCNC: 4.2 MMOL/L (ref 3.5–5.2)
PROT SERPL-MCNC: 8.5 G/DL (ref 6–8.5)
PROT UR QL STRIP: ABNORMAL
RBC # BLD AUTO: 5.25 10*6/MM3 (ref 4.14–5.8)
RBC # UR STRIP: ABNORMAL /HPF
REF LAB TEST METHOD: ABNORMAL
RHINOVIRUS RNA SPEC NAA+PROBE: NOT DETECTED
RSV RNA NPH QL NAA+NON-PROBE: NOT DETECTED
SARS-COV-2 RNA NPH QL NAA+NON-PROBE: NOT DETECTED
SODIUM SERPL-SCNC: 137 MMOL/L (ref 136–145)
SP GR UR STRIP: 1.02 (ref 1–1.03)
SQUAMOUS #/AREA URNS HPF: ABNORMAL /HPF
UROBILINOGEN UR QL STRIP: ABNORMAL
WBC # UR STRIP: ABNORMAL /HPF
WBC NRBC COR # BLD AUTO: 15.17 10*3/MM3 (ref 3.4–10.8)
WHOLE BLOOD HOLD COAG: NORMAL
WHOLE BLOOD HOLD SPECIMEN: NORMAL

## 2024-01-23 PROCEDURE — 25010000002 MIDAZOLAM PER 1MG: Performed by: NURSE ANESTHETIST, CERTIFIED REGISTERED

## 2024-01-23 PROCEDURE — 25010000002 KETOROLAC TROMETHAMINE PER 15 MG: Performed by: NURSE ANESTHETIST, CERTIFIED REGISTERED

## 2024-01-23 PROCEDURE — 25010000002 PROPOFOL 10 MG/ML EMULSION: Performed by: NURSE ANESTHETIST, CERTIFIED REGISTERED

## 2024-01-23 PROCEDURE — 25010000002 ONDANSETRON PER 1 MG: Performed by: NURSE ANESTHETIST, CERTIFIED REGISTERED

## 2024-01-23 PROCEDURE — 74176 CT ABD & PELVIS W/O CONTRAST: CPT

## 2024-01-23 PROCEDURE — 83690 ASSAY OF LIPASE: CPT

## 2024-01-23 PROCEDURE — 25010000002 KETOROLAC TROMETHAMINE PER 15 MG: Performed by: NURSE PRACTITIONER

## 2024-01-23 PROCEDURE — 81001 URINALYSIS AUTO W/SCOPE: CPT

## 2024-01-23 PROCEDURE — 25010000002 PIPERACILLIN SOD-TAZOBACTAM PER 1 G: Performed by: STUDENT IN AN ORGANIZED HEALTH CARE EDUCATION/TRAINING PROGRAM

## 2024-01-23 PROCEDURE — 85025 COMPLETE CBC W/AUTO DIFF WBC: CPT

## 2024-01-23 PROCEDURE — 25010000002 BUPIVACAINE (PF) 0.25 % SOLUTION: Performed by: SURGERY

## 2024-01-23 PROCEDURE — 25810000003 LACTATED RINGERS PER 1000 ML: Performed by: SURGERY

## 2024-01-23 PROCEDURE — 25010000002 CEFAZOLIN PER 500 MG: Performed by: SURGERY

## 2024-01-23 PROCEDURE — 44970 LAPAROSCOPY APPENDECTOMY: CPT | Performed by: SURGERY

## 2024-01-23 PROCEDURE — 99204 OFFICE O/P NEW MOD 45 MIN: CPT | Performed by: SURGERY

## 2024-01-23 PROCEDURE — 25010000002 HEPARIN (PORCINE) PER 1000 UNITS: Performed by: SURGERY

## 2024-01-23 PROCEDURE — 25010000002 MORPHINE PER 10 MG: Performed by: STUDENT IN AN ORGANIZED HEALTH CARE EDUCATION/TRAINING PROGRAM

## 2024-01-23 PROCEDURE — 25010000002 DEXAMETHASONE PER 1 MG: Performed by: NURSE ANESTHETIST, CERTIFIED REGISTERED

## 2024-01-23 PROCEDURE — 80053 COMPREHEN METABOLIC PANEL: CPT

## 2024-01-23 PROCEDURE — 25010000002 SUGAMMADEX 200 MG/2ML SOLUTION: Performed by: NURSE ANESTHETIST, CERTIFIED REGISTERED

## 2024-01-23 PROCEDURE — 96376 TX/PRO/DX INJ SAME DRUG ADON: CPT

## 2024-01-23 PROCEDURE — 99284 EMERGENCY DEPT VISIT MOD MDM: CPT

## 2024-01-23 PROCEDURE — 25010000002 FENTANYL CITRATE PF 50 MCG/ML SOLUTION PREFILLED SYRINGE: Performed by: NURSE ANESTHETIST, CERTIFIED REGISTERED

## 2024-01-23 PROCEDURE — 25010000002 LIDOCAINE 1 % SOLUTION: Performed by: SURGERY

## 2024-01-23 PROCEDURE — 0202U NFCT DS 22 TRGT SARS-COV-2: CPT | Performed by: NURSE PRACTITIONER

## 2024-01-23 PROCEDURE — 96365 THER/PROPH/DIAG IV INF INIT: CPT

## 2024-01-23 PROCEDURE — 96375 TX/PRO/DX INJ NEW DRUG ADDON: CPT

## 2024-01-23 DEVICE — RELOAD ECHELON ENDOPATH GST 45MM WHT: Type: IMPLANTABLE DEVICE | Site: ABDOMEN | Status: FUNCTIONAL

## 2024-01-23 DEVICE — RELOAD ECHELON ENDOPATH GST 45MM BLU: Type: IMPLANTABLE DEVICE | Site: ABDOMEN | Status: FUNCTIONAL

## 2024-01-23 DEVICE — CLIPAPPLR M/ ENDO LIGAMAX5 5MM 33CM MD/LG: Type: IMPLANTABLE DEVICE | Site: ABDOMEN | Status: FUNCTIONAL

## 2024-01-23 RX ORDER — LIDOCAINE HYDROCHLORIDE 10 MG/ML
INJECTION, SOLUTION INFILTRATION; PERINEURAL AS NEEDED
Status: DISCONTINUED | OUTPATIENT
Start: 2024-01-23 | End: 2024-01-23 | Stop reason: HOSPADM

## 2024-01-23 RX ORDER — SODIUM CHLORIDE 0.9 % (FLUSH) 0.9 %
10 SYRINGE (ML) INJECTION AS NEEDED
Status: DISCONTINUED | OUTPATIENT
Start: 2024-01-23 | End: 2024-01-23 | Stop reason: HOSPADM

## 2024-01-23 RX ORDER — MORPHINE SULFATE 2 MG/ML
2 INJECTION, SOLUTION INTRAMUSCULAR; INTRAVENOUS ONCE
Status: COMPLETED | OUTPATIENT
Start: 2024-01-23 | End: 2024-01-23

## 2024-01-23 RX ORDER — ROCURONIUM BROMIDE 50 MG/5 ML
SYRINGE (ML) INTRAVENOUS AS NEEDED
Status: DISCONTINUED | OUTPATIENT
Start: 2024-01-23 | End: 2024-01-23 | Stop reason: SURG

## 2024-01-23 RX ORDER — DEXAMETHASONE SODIUM PHOSPHATE 4 MG/ML
INJECTION, SOLUTION INTRA-ARTICULAR; INTRALESIONAL; INTRAMUSCULAR; INTRAVENOUS; SOFT TISSUE AS NEEDED
Status: DISCONTINUED | OUTPATIENT
Start: 2024-01-23 | End: 2024-01-23 | Stop reason: SURG

## 2024-01-23 RX ORDER — SUCCINYLCHOLINE/SOD CL,ISO/PF 200MG/10ML
SYRINGE (ML) INTRAVENOUS AS NEEDED
Status: DISCONTINUED | OUTPATIENT
Start: 2024-01-23 | End: 2024-01-23 | Stop reason: SURG

## 2024-01-23 RX ORDER — SODIUM CHLORIDE 9 MG/ML
40 INJECTION, SOLUTION INTRAVENOUS AS NEEDED
Status: DISCONTINUED | OUTPATIENT
Start: 2024-01-23 | End: 2024-01-23 | Stop reason: HOSPADM

## 2024-01-23 RX ORDER — ONDANSETRON 2 MG/ML
4 INJECTION INTRAMUSCULAR; INTRAVENOUS ONCE AS NEEDED
Status: DISCONTINUED | OUTPATIENT
Start: 2024-01-23 | End: 2024-01-23 | Stop reason: HOSPADM

## 2024-01-23 RX ORDER — IPRATROPIUM BROMIDE AND ALBUTEROL SULFATE 2.5; .5 MG/3ML; MG/3ML
3 SOLUTION RESPIRATORY (INHALATION) ONCE AS NEEDED
Status: COMPLETED | OUTPATIENT
Start: 2024-01-23 | End: 2024-01-23

## 2024-01-23 RX ORDER — HYDROCODONE BITARTRATE AND ACETAMINOPHEN 7.5; 325 MG/1; MG/1
1 TABLET ORAL ONCE AS NEEDED
Status: DISCONTINUED | OUTPATIENT
Start: 2024-01-23 | End: 2024-01-23 | Stop reason: HOSPADM

## 2024-01-23 RX ORDER — MIDAZOLAM HYDROCHLORIDE 2 MG/2ML
INJECTION, SOLUTION INTRAMUSCULAR; INTRAVENOUS AS NEEDED
Status: DISCONTINUED | OUTPATIENT
Start: 2024-01-23 | End: 2024-01-23 | Stop reason: SURG

## 2024-01-23 RX ORDER — AMOXICILLIN AND CLAVULANATE POTASSIUM 875; 125 MG/1; MG/1
1 TABLET, FILM COATED ORAL 2 TIMES DAILY
Qty: 20 TABLET | Refills: 0 | Status: SHIPPED | OUTPATIENT
Start: 2024-01-23 | End: 2024-02-02

## 2024-01-23 RX ORDER — HYDROCODONE BITARTRATE AND ACETAMINOPHEN 7.5; 325 MG/1; MG/1
1 TABLET ORAL EVERY 4 HOURS PRN
Qty: 15 TABLET | Refills: 0 | Status: SHIPPED | OUTPATIENT
Start: 2024-01-23

## 2024-01-23 RX ORDER — ONDANSETRON 2 MG/ML
INJECTION INTRAMUSCULAR; INTRAVENOUS AS NEEDED
Status: DISCONTINUED | OUTPATIENT
Start: 2024-01-23 | End: 2024-01-23 | Stop reason: SURG

## 2024-01-23 RX ORDER — KETOROLAC TROMETHAMINE 30 MG/ML
INJECTION, SOLUTION INTRAMUSCULAR; INTRAVENOUS AS NEEDED
Status: DISCONTINUED | OUTPATIENT
Start: 2024-01-23 | End: 2024-01-23 | Stop reason: SURG

## 2024-01-23 RX ORDER — KETOROLAC TROMETHAMINE 30 MG/ML
30 INJECTION, SOLUTION INTRAMUSCULAR; INTRAVENOUS ONCE
Status: COMPLETED | OUTPATIENT
Start: 2024-01-23 | End: 2024-01-23

## 2024-01-23 RX ORDER — MAGNESIUM HYDROXIDE 1200 MG/15ML
LIQUID ORAL AS NEEDED
Status: DISCONTINUED | OUTPATIENT
Start: 2024-01-23 | End: 2024-01-23 | Stop reason: HOSPADM

## 2024-01-23 RX ORDER — SODIUM CHLORIDE 0.9 % (FLUSH) 0.9 %
10 SYRINGE (ML) INJECTION EVERY 12 HOURS SCHEDULED
Status: DISCONTINUED | OUTPATIENT
Start: 2024-01-23 | End: 2024-01-23 | Stop reason: HOSPADM

## 2024-01-23 RX ORDER — PROPOFOL 10 MG/ML
VIAL (ML) INTRAVENOUS AS NEEDED
Status: DISCONTINUED | OUTPATIENT
Start: 2024-01-23 | End: 2024-01-23 | Stop reason: SURG

## 2024-01-23 RX ORDER — KETAMINE HCL IN NACL, ISO-OSM 100MG/10ML
SYRINGE (ML) INJECTION AS NEEDED
Status: DISCONTINUED | OUTPATIENT
Start: 2024-01-23 | End: 2024-01-23 | Stop reason: SURG

## 2024-01-23 RX ORDER — CEFAZOLIN SODIUM IN 0.9 % NACL 3 G/100 ML
3000 INTRAVENOUS SOLUTION, PIGGYBACK (ML) INTRAVENOUS ONCE
Status: COMPLETED | OUTPATIENT
Start: 2024-01-23 | End: 2024-01-23

## 2024-01-23 RX ORDER — BUPIVACAINE HYDROCHLORIDE 2.5 MG/ML
INJECTION, SOLUTION EPIDURAL; INFILTRATION; INTRACAUDAL AS NEEDED
Status: DISCONTINUED | OUTPATIENT
Start: 2024-01-23 | End: 2024-01-23 | Stop reason: HOSPADM

## 2024-01-23 RX ORDER — ONDANSETRON 4 MG/1
4 TABLET, ORALLY DISINTEGRATING ORAL EVERY 6 HOURS PRN
Qty: 20 TABLET | Refills: 0 | Status: SHIPPED | OUTPATIENT
Start: 2024-01-23

## 2024-01-23 RX ORDER — FENTANYL CITRATE 50 UG/ML
INJECTION, SOLUTION INTRAMUSCULAR; INTRAVENOUS AS NEEDED
Status: DISCONTINUED | OUTPATIENT
Start: 2024-01-23 | End: 2024-01-23 | Stop reason: SURG

## 2024-01-23 RX ORDER — SODIUM CHLORIDE, SODIUM LACTATE, POTASSIUM CHLORIDE, CALCIUM CHLORIDE 600; 310; 30; 20 MG/100ML; MG/100ML; MG/100ML; MG/100ML
50 INJECTION, SOLUTION INTRAVENOUS CONTINUOUS
Status: DISCONTINUED | OUTPATIENT
Start: 2024-01-23 | End: 2024-01-23 | Stop reason: HOSPADM

## 2024-01-23 RX ORDER — HEPARIN SODIUM 5000 [USP'U]/ML
5000 INJECTION, SOLUTION INTRAVENOUS; SUBCUTANEOUS ONCE
Status: COMPLETED | OUTPATIENT
Start: 2024-01-23 | End: 2024-01-23

## 2024-01-23 RX ADMIN — MORPHINE SULFATE 4 MG: 4 INJECTION, SOLUTION INTRAMUSCULAR; INTRAVENOUS at 14:35

## 2024-01-23 RX ADMIN — Medication 5 MG: at 17:03

## 2024-01-23 RX ADMIN — ONDANSETRON 4 MG: 2 INJECTION INTRAMUSCULAR; INTRAVENOUS at 17:18

## 2024-01-23 RX ADMIN — Medication 10 ML: at 14:36

## 2024-01-23 RX ADMIN — IPRATROPIUM BROMIDE AND ALBUTEROL SULFATE 3 ML: .5; 3 SOLUTION RESPIRATORY (INHALATION) at 18:46

## 2024-01-23 RX ADMIN — FENTANYL CITRATE 50 MCG: 50 INJECTION, SOLUTION INTRAMUSCULAR; INTRAVENOUS at 17:03

## 2024-01-23 RX ADMIN — MORPHINE SULFATE 2 MG: 2 INJECTION, SOLUTION INTRAMUSCULAR; INTRAVENOUS at 13:03

## 2024-01-23 RX ADMIN — FENTANYL CITRATE 50 MCG: 50 INJECTION, SOLUTION INTRAMUSCULAR; INTRAVENOUS at 17:20

## 2024-01-23 RX ADMIN — KETOROLAC TROMETHAMINE 30 MG: 30 INJECTION, SOLUTION INTRAMUSCULAR; INTRAVENOUS at 18:02

## 2024-01-23 RX ADMIN — DEXAMETHASONE SODIUM PHOSPHATE 4 MG: 4 INJECTION, SOLUTION INTRA-ARTICULAR; INTRALESIONAL; INTRAMUSCULAR; INTRAVENOUS; SOFT TISSUE at 17:18

## 2024-01-23 RX ADMIN — HEPARIN SODIUM 5000 UNITS: 5000 INJECTION, SOLUTION INTRAVENOUS; SUBCUTANEOUS at 16:58

## 2024-01-23 RX ADMIN — Medication 140 MG: at 17:08

## 2024-01-23 RX ADMIN — Medication 25 MG: at 17:20

## 2024-01-23 RX ADMIN — LIDOCAINE HYDROCHLORIDE 60 MG: 20 INJECTION, SOLUTION INTRAVENOUS at 17:08

## 2024-01-23 RX ADMIN — PIPERACILLIN SODIUM AND TAZOBACTAM SODIUM 3.38 G: 3; .375 INJECTION, SOLUTION INTRAVENOUS at 11:45

## 2024-01-23 RX ADMIN — Medication 3000 MG: at 17:03

## 2024-01-23 RX ADMIN — MIDAZOLAM HYDROCHLORIDE 2 MG: 1 INJECTION, SOLUTION INTRAMUSCULAR; INTRAVENOUS at 17:03

## 2024-01-23 RX ADMIN — PROPOFOL 200 MG: 10 INJECTION, EMULSION INTRAVENOUS at 17:08

## 2024-01-23 RX ADMIN — SODIUM CHLORIDE, POTASSIUM CHLORIDE, SODIUM LACTATE AND CALCIUM CHLORIDE 50 ML/HR: 600; 310; 30; 20 INJECTION, SOLUTION INTRAVENOUS at 15:43

## 2024-01-23 RX ADMIN — KETOROLAC TROMETHAMINE 30 MG: 30 INJECTION, SOLUTION INTRAMUSCULAR; INTRAVENOUS at 11:06

## 2024-01-23 RX ADMIN — MORPHINE SULFATE 2 MG: 2 INJECTION, SOLUTION INTRAMUSCULAR; INTRAVENOUS at 13:44

## 2024-01-23 RX ADMIN — Medication 45 MG: at 17:20

## 2024-01-23 RX ADMIN — SODIUM CHLORIDE, POTASSIUM CHLORIDE, SODIUM LACTATE AND CALCIUM CHLORIDE: 600; 310; 30; 20 INJECTION, SOLUTION INTRAVENOUS at 17:33

## 2024-01-23 RX ADMIN — SUGAMMADEX 200 MG: 100 INJECTION, SOLUTION INTRAVENOUS at 18:03

## 2024-01-23 RX ADMIN — Medication 25 MG: at 17:08

## 2024-01-23 NOTE — H&P (VIEW-ONLY)
Consults    General Surgery Consult     Name:Matthew Pineda Jr.  Age: 28 y.o.  Gender: male  : 1995  MRN: [id number]  Visit Number: [id number]  Admit Date: 2024  Date of Service: 24    Patient Care Team:  Monique Valles APRN as PCP - General (Family Medicine)    Reason for Consultation: abdominal pain, appendicitis    Chief complaint : abdominal pain      History of Present Illness:     Matthew Pineda Jr. is a 28 y.o. male patient who presented to the emergency department earlier today complaining the acute onset diffuse abdominal pain yesterday with associated nausea.  His pain progressively worsened through the night and into the morning, subsequently prompting him to come to the emergency department for further evaluation.  He denies any fevers or chills.  He denies vomiting, diarrhea, or urinary tract symptoms.  He denies any recent travel or sick contacts.  He has no history of similar symptoms.  In the emergency department he was reported to have generalized tenderness to palpation on exam.  Labs are notable for an elevated white blood cell count of 15,000 with a neutrophil predominance/left shift.  Urinalysis demonstrated 1+ protein, and trace bacteria but was otherwise negative.  Comprehensive metabolic panel was normal. He went on to have a CT scan of the abdomen pelvis which demonstrated an 11 mm appendicolith within the proximal portion of the appendix.  Distal to this, the appendix was noted to be very dilated measuring up to 18 mm with an air-fluid level.  There was noted to be infiltration of the surrounding fatty tissue with mildly enlarged lymph nodes adjacent to this measuring 11 mm.  Findings were felt to be consistent with acute appendicitis.  Based on this information, I was asked to evaluate the patient in consultation by the emergency department provider.        Patient Active Problem List   Diagnosis    Palpitations    PVC's (premature ventricular contractions)     Snoring    Obesity (BMI 30-39.9)    Elevated blood pressure reading in office without diagnosis of hypertension    Anxiety    Other male erectile dysfunction    Acute appendicitis with localized peritonitis, without perforation, abscess, or gangrene       Past Medical History:   Diagnosis Date    Anxiety     Tachycardia     pt reports seeing cardiologist in 2018 about tachycardia 100-110bpm       History reviewed. No pertinent surgical history.    Family History   Problem Relation Age of Onset    Hypertension Father        Social History     Socioeconomic History    Marital status: Single   Tobacco Use    Smoking status: Some Days     Packs/day: .5     Types: Cigarettes    Smokeless tobacco: Never   Vaping Use    Vaping Use: Never used   Substance and Sexual Activity    Alcohol use: Yes     Comment: socially    Drug use: No    Sexual activity: Yes         Current Facility-Administered Medications:     ceFAZolin in Sodium Chloride (ANCEF) IVPB solution 3,000 mg, 3,000 mg, Intravenous, Once, Nohemi Gonzales MD    lactated ringers infusion, 50 mL/hr, Intravenous, Continuous, Nohemi Gonzales MD, Last Rate: 50 mL/hr at 01/23/24 1543, 50 mL/hr at 01/23/24 1543    [MAR Hold] sodium chloride 0.9 % flush 10 mL, 10 mL, Intravenous, PRN, Emergency, Triage Protocol, MD, 10 mL at 01/23/24 1436    sodium chloride 0.9 % flush 10 mL, 10 mL, Intravenous, Q12H, Nohemi Gonzales MD    sodium chloride 0.9 % flush 10 mL, 10 mL, Intravenous, PRN, Nohemi Gonzales MD    sodium chloride 0.9 % infusion 40 mL, 40 mL, Intravenous, PRN, Nohemi Gonzales MD    Medications Prior to Admission   Medication Sig Dispense Refill Last Dose    hydrOXYzine (ATARAX) 25 MG tablet Take 1 tablet by mouth 3 (Three) Times a Day As Needed for Anxiety, Allergies or Itching. 90 tablet 0 1/22/2024 at 1700    ALPRAZolam (Xanax) 0.25 MG tablet Take 1 tablet by mouth 2 (Two) Times a Day As Needed for Anxiety (for flying). (Patient not taking: Reported on  1/23/2024) 4 tablet 0 Not Taking       No Known Allergies    Review of Systems   Constitutional:  Negative for chills, fever and unexpected weight change.   HENT:  Negative for trouble swallowing and voice change.    Eyes:  Negative for visual disturbance.   Respiratory:  Negative for apnea, cough, chest tightness, shortness of breath and wheezing.    Cardiovascular:  Negative for chest pain, palpitations and leg swelling.   Gastrointestinal:  Positive for abdominal pain and nausea. Negative for abdominal distention, anal bleeding, blood in stool, constipation, diarrhea, rectal pain and vomiting.   Endocrine: Negative for cold intolerance and heat intolerance.   Genitourinary:  Negative for difficulty urinating, dysuria, flank pain, scrotal swelling and testicular pain.   Musculoskeletal:  Negative for back pain, gait problem and joint swelling.   Skin:  Negative for color change, rash and wound.   Neurological:  Negative for dizziness, syncope, speech difficulty, weakness, numbness and headaches.   Hematological:  Negative for adenopathy. Does not bruise/bleed easily.   Psychiatric/Behavioral:  Negative for confusion. The patient is not nervous/anxious.        OBJECTIVE:     Vital Signs  Temp:  [97.2 °F (36.2 °C)-97.7 °F (36.5 °C)] 97.2 °F (36.2 °C)  Heart Rate:  [89-96] 90  Resp:  [18-20] 18  BP: (139-168)/() 139/86    I/O this shift:  In: 50 [IV Piggyback:50]  Out: -   No intake/output data recorded.      Physical Exam:      General Appearance:    Alert, cooperative, in no acute distress   Head:    Normocephalic, without obvious abnormality, atraumatic   Eyes:            Lids and lashes normal, conjunctivae and sclerae normal, no icterus   Ears:    Ears appear intact with no abnormalities noted   Lungs:     Respirations regular, even and unlabored    Heart:    Regular rhythm and normal rate   Abdomen:   Soft, nondistended, diffuse tenderness across the lower abdomen with most pronounced tenderness in the  right lower quadrant consistent with focal peritonitis.   Genitalia:    Deferred   Extremities:   Moves all extremities well, no edema, no cyanosis, no  redness   Pulses:   Pulses palpable and equal bilaterally   Skin:   No bleeding, bruising or rash   Neurologic:   AAOx3, no gross deficits         Results Review:   I have reviewed the entirety of the patient's clinical lab results.  I have also personally reviewed the patient's imaging  Narrative & Impression   IPROCEDURE: CT ABDOMEN PELVIS WO CONTRAST-     HISTORY: generalize abd pain (lower)     COMPARISON: None .     PROCEDURE: Axial images were obtained from the lung bases to the pubic  symphysis by computed tomography. This study was performed with  techniques to keep radiation doses as low as reasonably achievable,  (ALARA). Individualized dose reduction techniques using automated  exposure control or adjustment of mA and/or kV according to the patient  size were employed.     FINDINGS:     ABDOMEN: The lung bases are clear. The heart is proper size. The limited  non-contrast images of the liver are unremarkable. Gallbladder present  with no CT visible stones. The spleen is unremarkable. No adrenal masses  are seen. The aorta is normal in caliber. There is no significant free  fluid or adenopathy.  There is no nephrolithiasis. There is no  hydronephrosis.     PELVIS: The GI tract demonstrate no obstruction. The appendix is  identified. Proximally there is a 11 mm appendicolith. The appendix  distal to this is very dilated measuring up to 18 mm with an air-fluid  level. There is infiltration of the surrounding fat. There is an  adjacent mildly enlarged lymph node measuring 11 mm. Findings consistent  with acute appendicitis. No free fluid identified in the pelvis. No free  air seen. Prostate is normal in size. The urinary bladder is  unremarkable. There is no fluid or adenopathy.      IMPRESSION:  Acute appendicitis with proximal appendicolith and  appendix  dilated up to 18 mm; Cindy in the emergency room notified at 11:30 a.m.  January 23, 2024.              CTDI: 11.86 mGy  DLP:687.66 mGy.cm        This report was signed and finalized on 1/23/2024 11:32 AM by Earline Christina MD.       Lab Results (last 72 hours)       Procedure Component Value Units Date/Time    Respiratory Panel PCR w/COVID-19(SARS-CoV-2) KIERAN/FAM/JASON/PAD/COR/ELY In-House, NP Swab in UTM/VTM, 2 HR TAT - Swab, Nasopharynx [[id number]]  (Normal) Collected: 01/23/24 1112    Specimen: Swab from Nasopharynx Updated: 01/23/24 1253     ADENOVIRUS, PCR Not Detected     Coronavirus 229E Not Detected     Coronavirus HKU1 Not Detected     Coronavirus NL63 Not Detected     Coronavirus OC43 Not Detected     COVID19 Not Detected     Human Metapneumovirus Not Detected     Human Rhinovirus/Enterovirus Not Detected     Influenza A PCR Not Detected     Influenza B PCR Not Detected     Parainfluenza Virus 1 Not Detected     Parainfluenza Virus 2 Not Detected     Parainfluenza Virus 3 Not Detected     Parainfluenza Virus 4 Not Detected     RSV, PCR Not Detected     Bordetella pertussis pcr Not Detected     Bordetella parapertussis PCR Not Detected     Chlamydophila pneumoniae PCR Not Detected     Mycoplasma pneumo by PCR Not Detected    Narrative:      In the setting of a positive respiratory panel with a viral infection PLUS a negative procalcitonin without other underlying concern for bacterial infection, consider observing off antibiotics or discontinuation of antibiotics and continue supportive care. If the respiratory panel is positive for atypical bacterial infection (Bordetella pertussis, Chlamydophila pneumoniae, or Mycoplasma pneumoniae), consider antibiotic de-escalation to target atypical bacterial infection.    [place] Line Draw [[id number]] Collected: 01/23/24 1104    Specimen: Blood Updated: 01/23/24 1215    Narrative:      The following orders were created for panel order [place] Line Draw.  Procedure                                Abnormality         Status                     ---------                               -----------         ------                     Green Top (Gel)[[id number]]                                  Final result               Lavender Top[[id number]]                                     Final result               Gold Top - SST[[id number]]                                   Final result               Light Blue Top[[id number]]                                   Final result                 Please view results for these tests on the individual orders.    Green Top (Gel) [[id number]] Collected: 01/23/24 1104    Specimen: Blood Updated: 01/23/24 1215     Extra Tube Hold for add-ons.     Comment: Auto resulted.       Lavender Top [[id number]] Collected: 01/23/24 1104    Specimen: Blood Updated: 01/23/24 1215     Extra Tube hold for add-on     Comment: Auto resulted       Gold Top - SST [[id number]] Collected: 01/23/24 1104    Specimen: Blood Updated: 01/23/24 1215     Extra Tube Hold for add-ons.     Comment: Auto resulted.       Light Blue Top [[id number]] Collected: 01/23/24 1104    Specimen: Blood Updated: 01/23/24 1215     Extra Tube Hold for add-ons.     Comment: Auto resulted       Comprehensive Metabolic Panel [[id number]]  (Abnormal) Collected: 01/23/24 1104    Specimen: Blood Updated: 01/23/24 1131     Glucose 108 mg/dL      BUN 14 mg/dL      Creatinine 0.88 mg/dL      Sodium 137 mmol/L      Potassium 4.2 mmol/L      Chloride 99 mmol/L      CO2 26.0 mmol/L      Calcium 9.2 mg/dL      Total Protein 8.5 g/dL      Albumin 4.7 g/dL      ALT (SGPT) 40 U/L      AST (SGOT) 24 U/L      Alkaline Phosphatase 81 U/L      Total Bilirubin 0.6 mg/dL      Globulin 3.8 gm/dL      A/G Ratio 1.2 g/dL      BUN/Creatinine Ratio 15.9     Anion Gap 12.0 mmol/L      eGFR 119.4 mL/min/1.73     Narrative:      GFR Normal >60  Chronic Kidney Disease <60  Kidney Failure <15      Lipase [[id number]]  (Normal) Collected: 01/23/24 1104     Specimen: Blood Updated: 01/23/24 1131     Lipase 22 U/L     Urinalysis, Microscopic Only - Urine, Clean Catch [[id number]]  (Abnormal) Collected: 01/23/24 1104    Specimen: Urine, Clean Catch Updated: 01/23/24 1125     RBC, UA 0-2 /HPF      WBC, UA 0-2 /HPF      Bacteria, UA Trace /HPF      Squamous Epithelial Cells, UA 0-2 /HPF      Hyaline Casts, UA None Seen /LPF      Methodology Manual Light Microscopy    CBC & Differential [[id number]]  (Abnormal) Collected: 01/23/24 1104    Specimen: Blood Updated: 01/23/24 1115    Narrative:      The following orders were created for panel order CBC & Differential.  Procedure                               Abnormality         Status                     ---------                               -----------         ------                     CBC Auto Differential[[id number]]        Abnormal            Final result                 Please view results for these tests on the individual orders.    CBC Auto Differential [[id number]]  (Abnormal) Collected: 01/23/24 1104    Specimen: Blood Updated: 01/23/24 1115     WBC 15.17 10*3/mm3      RBC 5.25 10*6/mm3      Hemoglobin 16.5 g/dL      Hematocrit 45.9 %      MCV 87.4 fL      MCH 31.4 pg      MCHC 35.9 g/dL      RDW 11.8 %      RDW-SD 37.8 fl      MPV 9.7 fL      Platelets 235 10*3/mm3      Neutrophil % 78.7 %      Lymphocyte % 14.6 %      Monocyte % 5.2 %      Eosinophil % 0.6 %      Basophil % 0.3 %      Immature Grans % 0.6 %      Neutrophils, Absolute 11.94 10*3/mm3      Lymphocytes, Absolute 2.21 10*3/mm3      Monocytes, Absolute 0.79 10*3/mm3      Eosinophils, Absolute 0.09 10*3/mm3      Basophils, Absolute 0.05 10*3/mm3      Immature Grans, Absolute 0.09 10*3/mm3      nRBC 0.0 /100 WBC     Urinalysis With Microscopic If Indicated (No Culture) - Urine, Clean Catch [[id number]]  (Abnormal) Collected: 01/23/24 1104    Specimen: Urine, Clean Catch Updated: 01/23/24 1112     Color, UA Yellow     Appearance, UA Clear     pH, UA 7.5      Specific Gravity, UA 1.022     Glucose, UA Negative     Ketones, UA Negative     Bilirubin, UA Negative     Blood, UA Negative     Protein, UA 30 mg/dL (1+)     Leuk Esterase, UA Negative     Nitrite, UA Negative     Urobilinogen, UA 1.0 E.U./dL                            ASSESSMENT/PLAN:      Acute appendicitis with localized peritonitis, without perforation, abscess, or gangrene    Mr. Pineda is a 28-year-old male patient who presented with a 24-hour history of diffuse abdominal pain, and was found to have abdominal tenderness and leukocytosis suggestive of acute appendicitis.  CT imaging confirmed diagnosis, and I have recommended to the patient that we proceed with laparoscopic appendectomy for definitive management.  The surgical procedure was explained the patient in detail along with the risk, benefits, and alternatives.  We specifically discussed the risks of bleeding, infection, conversion to an open procedure, the risk of postoperative abscess formation, need for additional procedures, and the risks related to anesthesia.  He understands these, and is willing to proceed.  I am hopeful that he will be able to be successfully discharged home from the recovery room postoperatively, assuming an uncomplicated intraoperative and immediate postoperative course.  He and his family do understand the potential need for hospital admission if the intraoperative findings or immediate postoperative course dictate admission as the next best course of management.  At the conclusion of our discussion, the patient had no additional questions and was satisfied with the plan of care.  He verbalizes understanding of the surgical procedure as described to him, and was willing to proceed as discussed.  Informed consent was obtained from the patient.    Nohemi Gonzales MD  01/23/24  17:06 EST

## 2024-01-23 NOTE — ANESTHESIA POSTPROCEDURE EVALUATION
Patient: Matthew Pineda Jr.    Procedure Summary       Date: 01/23/24 Room / Location: [facility] OR  / [facility] OR    Anesthesia Start: 1703 Anesthesia Stop: 1825    Procedure: APPENDECTOMY LAPAROSCOPIC (Abdomen) Diagnosis:       Acute appendicitis with localized peritonitis, without perforation, abscess, or gangrene      (Acute appendicitis with localized peritonitis, without perforation, abscess, or gangrene [K35.30])    Surgeons: Nohemi Gonzales MD Provider: Walter Lopez CRNA    Anesthesia Type: general ASA Status: 2 - Emergent            Anesthesia Type: general    Vitals  No vitals data found for the desired time range.          Post Anesthesia Care and Evaluation    Patient location during evaluation: PACU  Patient participation: complete - patient participated  Level of consciousness: awake and alert  Pain score: 2  Pain management: satisfactory to patient    Airway patency: patent  Anesthetic complications: No anesthetic complications  PONV Status: none  Cardiovascular status: acceptable and stable  Respiratory status: acceptable and spontaneous ventilation  Hydration status: acceptable    Comments: Vitals signs as noted in nursing documentation as per protocol.

## 2024-01-23 NOTE — OP NOTE
PROCEDURE DATE: 1/23/2024    SURGEON: Nohemi Gonzales MD, FACS    PREOPERATIVE DIAGNOSIS: Acute appendicitis with localized peritonitis without perforation, abscess, or gangrene    POSTOPERATIVE DIAGNOSIS: Same    PROCEDURE: Laparoscopic appendectomy    ANESTHESIA: GETA    EBL: 5mL    SPECIMENS:    ID Source Type Tests Collected By Collected At Frozen?    A Large Intestine, Appendix Tissue TISSUE EXAM, [facility] ([facility])   Ros Bates, RN 1/23/24 2873          IMPLANTS:   Implant Name Type Inv. Item Serial No.  Lot No. LRB No. Used Action   RELOAD ECHELON ENDOPATH GST 45MM GABRIELLE - [id number] Implant RELOAD ECHELON ENDOPATH GST 45MM GABRIELLE  [employer]  DIV OF J AND J [id number] N/A 1 Implanted   RELOAD ECHELON ENDOPATH GST 45MM WHT - [id number] Implant RELOAD ECHELON ENDOPATH GST 45MM WHT  [employer]  DIV OF J AND J [id number] N/A 2 Implanted   CLIPAPPLR M/ ENDO LIGAMAX5 5MM 33CM MD/LG - [id number] Implant CLIPAPPLR M/ ENDO LIGAMAX5 5MM 33CM MD/LG  [employer]  DIV OF J AND J [id number] N/A 1 Implanted       INDICATIONS FOR THE PROCEDURE:  Mr. Pineda is a 28-year-old male patient who came to the emergency department with a 24-hour history of abdominal pain, which upon workup in the emergency department was found to be secondary to acute appendicitis.  Laparoscopic appendectomy was recommended for definitive management, and he was counseled accordingly.  He was agreeable to the surgical procedure as described, and is now being brought to the operating room for the same having provided informed consent.    DESCRIPTION OF THE PROCEDURE:  The patient was seen and examined in the preoperative holding area on the day of surgery and there are no changes to the medical history.  The patient was then taken to the operating room and placed supine on the operating table where a timeout is performed using the WHO checklist.  The patient received appropriate preoperative antibiotics as well as subcutaneous heparin for  DVT prophylaxis.    Following the satisfactory induction of general anesthesia, a Vasquez catheter was inserted for decompression of the patient's bladder.  The patient's abdomen was then prepped and draped in the usual sterile fashion.  A vertical incision was made just above the umbilicus and was carried through the soft tissue to the level of the fascia.  The fascia was grasped and elevated between Kocher clamps and incised sharply with a knife.  Entry was confirmed into the peritoneum visually and there was no bowel visible in the vicinity of this incision.  Two stay sutures of 0 Vicryl were placed in either side of the fascia and a Mckinney cannula was inserted under direct visualization.  The sutures were used to secure the cannula.    The abdomen was insufflated with carbon dioxide to a pressure of 15 mmHg and the laparoscope was introduced.  The abdomen was inspected and no injuries were noted from initial trocar placement.  Following this, 2 additional 5 mm ports were placed in the left lateral and suprapubic positions under direct visualization.  The patient was placed into the Trendelenburg position with the left side down and the appendix was identified in the right lower quadrant.  It was noted to be acutely inflamed.  Some murky fluid was seen in the right lower quadrant and right paracolic gutter.  This was evacuated with suction.  The mesoappendix was grasped and used to retract the appendix anteriorly towards the abdominal wall.  A [place] dissector was used to make a window in the avascular plane between the appendix and the mesoappendix.  Once this window was created, the linear cutting stapler was introduced through the umbilical port and a single fire of the stapler was used to transect the appendix at its junction with the cecum.  Two additional fires of the linear cutting stapler were used to transect the mesoappendix.  The staple lines were inspected and some ongoing oozing from the mesoappendix  staple line was controlled with application of ligaclips.  The area was inspected once again and hemostasis was found to be acceptable.  The appendix was then placed into an Endo Catch bag.  The operative field was then irrigated and inspected for hemostasis.  This was found to be excellent.  A brief survey of the remainder of the peritoneal cavity revealed no additional abnormalities. The 5mm abdominal trocars were removed under direct visualization and no bleeding was noted.  The laparoscope was withdrawn and the abdomen was desufflated.  The Mckinney cannula was removed out of the umbilical port site followed by the appendix which was completely contained within the Endo Catch bag.  This was passed off the field as specimen.  The fascia of the umbilical port site was then closed using a 0 Vicryl suture placed in a figure-of-eight fashion ×2.  The skin of all incisions was closed using a 5-0 PDS suture placed in a subcuticular fashion.  0.25% Marcaine was injected into the wounds for postoperative analgesia.  Mastisol and steri strips were applied to the wounds and covered with dry sterile dressings.    There were no immediate complications noted and the procedure was well tolerated by the patient.  All sponge, needle,  and instrument  counts were correct at the conclusion of procedure.  Dr. Gonzales was present scrubbed for the procedure and its entirety.  The patient was awakened from anesthesia and taken to the recovery room in good condition.

## 2024-01-23 NOTE — ANESTHESIA PROCEDURE NOTES
Airway  Urgency: elective    Date/Time: 1/23/2024 5:09 PM  End Time:1/23/2024 5:10 PM  Airway not difficult    General Information and Staff    Patient location during procedure: OR  CRNA/CAA: Walter Lopez CRNA    Indications and Patient Condition  Indications for airway management: airway protection    Preoxygenated: yes  Mask difficulty assessment: 0 - not attempted    Final Airway Details  Final airway type: endotracheal airway      Successful airway: ETT  Cuffed: yes   Successful intubation technique: direct laryngoscopy  Facilitating devices/methods: cricoid pressure  Endotracheal tube insertion site: oral  Blade: Naima  Blade size: 3  ETT size (mm): 7.5  Cormack-Lehane Classification: grade IIa - partial view of glottis  Placement verified by: chest auscultation and capnometry   Cuff volume (mL): 8  Measured from: lips  ETT/EBT  to lips (cm): 22  Number of attempts at approach: 1  Assessment: lips, teeth, and gum same as pre-op and atraumatic intubation    Additional Comments  +ETCO2, BBS, atraumatic, teeth as preop

## 2024-01-23 NOTE — CONSULTS
Consults    General Surgery Consult     Name:Matthew Pineda Jr.  Age: 28 y.o.  Gender: male  : 1995  MRN: [id number]  Visit Number: [id number]  Admit Date: 2024  Date of Service: 24    Patient Care Team:  Monique Valles APRN as PCP - General (Family Medicine)    Reason for Consultation: abdominal pain, appendicitis    Chief complaint : abdominal pain      History of Present Illness:     Matthew Pineda Jr. is a 28 y.o. male patient who presented to the emergency department earlier today complaining the acute onset diffuse abdominal pain yesterday with associated nausea.  His pain progressively worsened through the night and into the morning, subsequently prompting him to come to the emergency department for further evaluation.  He denies any fevers or chills.  He denies vomiting, diarrhea, or urinary tract symptoms.  He denies any recent travel or sick contacts.  He has no history of similar symptoms.  In the emergency department he was reported to have generalized tenderness to palpation on exam.  Labs are notable for an elevated white blood cell count of 15,000 with a neutrophil predominance/left shift.  Urinalysis demonstrated 1+ protein, and trace bacteria but was otherwise negative.  Comprehensive metabolic panel was normal. He went on to have a CT scan of the abdomen pelvis which demonstrated an 11 mm appendicolith within the proximal portion of the appendix.  Distal to this, the appendix was noted to be very dilated measuring up to 18 mm with an air-fluid level.  There was noted to be infiltration of the surrounding fatty tissue with mildly enlarged lymph nodes adjacent to this measuring 11 mm.  Findings were felt to be consistent with acute appendicitis.  Based on this information, I was asked to evaluate the patient in consultation by the emergency department provider.        Patient Active Problem List   Diagnosis    Palpitations    PVC's (premature ventricular contractions)     Snoring    Obesity (BMI 30-39.9)    Elevated blood pressure reading in office without diagnosis of hypertension    Anxiety    Other male erectile dysfunction    Acute appendicitis with localized peritonitis, without perforation, abscess, or gangrene       Past Medical History:   Diagnosis Date    Anxiety     Tachycardia     pt reports seeing cardiologist in 2018 about tachycardia 100-110bpm       History reviewed. No pertinent surgical history.    Family History   Problem Relation Age of Onset    Hypertension Father        Social History     Socioeconomic History    Marital status: Single   Tobacco Use    Smoking status: Some Days     Packs/day: .5     Types: Cigarettes    Smokeless tobacco: Never   Vaping Use    Vaping Use: Never used   Substance and Sexual Activity    Alcohol use: Yes     Comment: socially    Drug use: No    Sexual activity: Yes         Current Facility-Administered Medications:     ceFAZolin in Sodium Chloride (ANCEF) IVPB solution 3,000 mg, 3,000 mg, Intravenous, Once, Nohemi Gonzales MD    lactated ringers infusion, 50 mL/hr, Intravenous, Continuous, Nohemi Gonzales MD, Last Rate: 50 mL/hr at 01/23/24 1543, 50 mL/hr at 01/23/24 1543    [MAR Hold] sodium chloride 0.9 % flush 10 mL, 10 mL, Intravenous, PRN, Emergency, Triage Protocol, MD, 10 mL at 01/23/24 1436    sodium chloride 0.9 % flush 10 mL, 10 mL, Intravenous, Q12H, Nohemi Gonzales MD    sodium chloride 0.9 % flush 10 mL, 10 mL, Intravenous, PRN, Nohemi Gonzales MD    sodium chloride 0.9 % infusion 40 mL, 40 mL, Intravenous, PRN, Nohemi Gonzales MD    Medications Prior to Admission   Medication Sig Dispense Refill Last Dose    hydrOXYzine (ATARAX) 25 MG tablet Take 1 tablet by mouth 3 (Three) Times a Day As Needed for Anxiety, Allergies or Itching. 90 tablet 0 1/22/2024 at 1700    ALPRAZolam (Xanax) 0.25 MG tablet Take 1 tablet by mouth 2 (Two) Times a Day As Needed for Anxiety (for flying). (Patient not taking: Reported on  1/23/2024) 4 tablet 0 Not Taking       No Known Allergies    Review of Systems   Constitutional:  Negative for chills, fever and unexpected weight change.   HENT:  Negative for trouble swallowing and voice change.    Eyes:  Negative for visual disturbance.   Respiratory:  Negative for apnea, cough, chest tightness, shortness of breath and wheezing.    Cardiovascular:  Negative for chest pain, palpitations and leg swelling.   Gastrointestinal:  Positive for abdominal pain and nausea. Negative for abdominal distention, anal bleeding, blood in stool, constipation, diarrhea, rectal pain and vomiting.   Endocrine: Negative for cold intolerance and heat intolerance.   Genitourinary:  Negative for difficulty urinating, dysuria, flank pain, scrotal swelling and testicular pain.   Musculoskeletal:  Negative for back pain, gait problem and joint swelling.   Skin:  Negative for color change, rash and wound.   Neurological:  Negative for dizziness, syncope, speech difficulty, weakness, numbness and headaches.   Hematological:  Negative for adenopathy. Does not bruise/bleed easily.   Psychiatric/Behavioral:  Negative for confusion. The patient is not nervous/anxious.        OBJECTIVE:     Vital Signs  Temp:  [97.2 °F (36.2 °C)-97.7 °F (36.5 °C)] 97.2 °F (36.2 °C)  Heart Rate:  [89-96] 90  Resp:  [18-20] 18  BP: (139-168)/() 139/86    I/O this shift:  In: 50 [IV Piggyback:50]  Out: -   No intake/output data recorded.      Physical Exam:      General Appearance:    Alert, cooperative, in no acute distress   Head:    Normocephalic, without obvious abnormality, atraumatic   Eyes:            Lids and lashes normal, conjunctivae and sclerae normal, no icterus   Ears:    Ears appear intact with no abnormalities noted   Lungs:     Respirations regular, even and unlabored    Heart:    Regular rhythm and normal rate   Abdomen:   Soft, nondistended, diffuse tenderness across the lower abdomen with most pronounced tenderness in the  right lower quadrant consistent with focal peritonitis.   Genitalia:    Deferred   Extremities:   Moves all extremities well, no edema, no cyanosis, no  redness   Pulses:   Pulses palpable and equal bilaterally   Skin:   No bleeding, bruising or rash   Neurologic:   AAOx3, no gross deficits         Results Review:   I have reviewed the entirety of the patient's clinical lab results.  I have also personally reviewed the patient's imaging  Narrative & Impression   IPROCEDURE: CT ABDOMEN PELVIS WO CONTRAST-     HISTORY: generalize abd pain (lower)     COMPARISON: None .     PROCEDURE: Axial images were obtained from the lung bases to the pubic  symphysis by computed tomography. This study was performed with  techniques to keep radiation doses as low as reasonably achievable,  (ALARA). Individualized dose reduction techniques using automated  exposure control or adjustment of mA and/or kV according to the patient  size were employed.     FINDINGS:     ABDOMEN: The lung bases are clear. The heart is proper size. The limited  non-contrast images of the liver are unremarkable. Gallbladder present  with no CT visible stones. The spleen is unremarkable. No adrenal masses  are seen. The aorta is normal in caliber. There is no significant free  fluid or adenopathy.  There is no nephrolithiasis. There is no  hydronephrosis.     PELVIS: The GI tract demonstrate no obstruction. The appendix is  identified. Proximally there is a 11 mm appendicolith. The appendix  distal to this is very dilated measuring up to 18 mm with an air-fluid  level. There is infiltration of the surrounding fat. There is an  adjacent mildly enlarged lymph node measuring 11 mm. Findings consistent  with acute appendicitis. No free fluid identified in the pelvis. No free  air seen. Prostate is normal in size. The urinary bladder is  unremarkable. There is no fluid or adenopathy.      IMPRESSION:  Acute appendicitis with proximal appendicolith and  appendix  dilated up to 18 mm; Cindy in the emergency room notified at 11:30 a.m.  January 23, 2024.              CTDI: 11.86 mGy  DLP:687.66 mGy.cm        This report was signed and finalized on 1/23/2024 11:32 AM by Earline Christina MD.       Lab Results (last 72 hours)       Procedure Component Value Units Date/Time    Respiratory Panel PCR w/COVID-19(SARS-CoV-2) KIERAN/FAM/JASON/PAD/COR/ELY In-House, NP Swab in UTM/VTM, 2 HR TAT - Swab, Nasopharynx [[id number]]  (Normal) Collected: 01/23/24 1112    Specimen: Swab from Nasopharynx Updated: 01/23/24 1253     ADENOVIRUS, PCR Not Detected     Coronavirus 229E Not Detected     Coronavirus HKU1 Not Detected     Coronavirus NL63 Not Detected     Coronavirus OC43 Not Detected     COVID19 Not Detected     Human Metapneumovirus Not Detected     Human Rhinovirus/Enterovirus Not Detected     Influenza A PCR Not Detected     Influenza B PCR Not Detected     Parainfluenza Virus 1 Not Detected     Parainfluenza Virus 2 Not Detected     Parainfluenza Virus 3 Not Detected     Parainfluenza Virus 4 Not Detected     RSV, PCR Not Detected     Bordetella pertussis pcr Not Detected     Bordetella parapertussis PCR Not Detected     Chlamydophila pneumoniae PCR Not Detected     Mycoplasma pneumo by PCR Not Detected    Narrative:      In the setting of a positive respiratory panel with a viral infection PLUS a negative procalcitonin without other underlying concern for bacterial infection, consider observing off antibiotics or discontinuation of antibiotics and continue supportive care. If the respiratory panel is positive for atypical bacterial infection (Bordetella pertussis, Chlamydophila pneumoniae, or Mycoplasma pneumoniae), consider antibiotic de-escalation to target atypical bacterial infection.    [place] Draw [[id number]] Collected: 01/23/24 1104    Specimen: Blood Updated: 01/23/24 1215    Narrative:      The following orders were created for panel order [place] Draw.  Procedure                                Abnormality         Status                     ---------                               -----------         ------                     Green Top (Gel)[[id number]]                                  Final result               Lavender Top[[id number]]                                     Final result               Gold Top - SST[[id number]]                                   Final result               Light Blue Top[[id number]]                                   Final result                 Please view results for these tests on the individual orders.    Green Top (Gel) [[id number]] Collected: 01/23/24 1104    Specimen: Blood Updated: 01/23/24 1215     Extra Tube Hold for add-ons.     Comment: Auto resulted.       Lavender Top [[id number]] Collected: 01/23/24 1104    Specimen: Blood Updated: 01/23/24 1215     Extra Tube hold for add-on     Comment: Auto resulted       Gold Top - SST [[id number]] Collected: 01/23/24 1104    Specimen: Blood Updated: 01/23/24 1215     Extra Tube Hold for add-ons.     Comment: Auto resulted.       Light Blue Top [[id number]] Collected: 01/23/24 1104    Specimen: Blood Updated: 01/23/24 1215     Extra Tube Hold for add-ons.     Comment: Auto resulted       Comprehensive Metabolic Panel [[id number]]  (Abnormal) Collected: 01/23/24 1104    Specimen: Blood Updated: 01/23/24 1131     Glucose 108 mg/dL      BUN 14 mg/dL      Creatinine 0.88 mg/dL      Sodium 137 mmol/L      Potassium 4.2 mmol/L      Chloride 99 mmol/L      CO2 26.0 mmol/L      Calcium 9.2 mg/dL      Total Protein 8.5 g/dL      Albumin 4.7 g/dL      ALT (SGPT) 40 U/L      AST (SGOT) 24 U/L      Alkaline Phosphatase 81 U/L      Total Bilirubin 0.6 mg/dL      Globulin 3.8 gm/dL      A/G Ratio 1.2 g/dL      BUN/Creatinine Ratio 15.9     Anion Gap 12.0 mmol/L      eGFR 119.4 mL/min/1.73     Narrative:      GFR Normal >60  Chronic Kidney Disease <60  Kidney Failure <15      Lipase [[id number]]  (Normal) Collected: 01/23/24 1104     Specimen: Blood Updated: 01/23/24 1131     Lipase 22 U/L     Urinalysis, Microscopic Only - Urine, Clean Catch [[id number]]  (Abnormal) Collected: 01/23/24 1104    Specimen: Urine, Clean Catch Updated: 01/23/24 1125     RBC, UA 0-2 /HPF      WBC, UA 0-2 /HPF      Bacteria, UA Trace /HPF      Squamous Epithelial Cells, UA 0-2 /HPF      Hyaline Casts, UA None Seen /LPF      Methodology Manual Light Microscopy    CBC & Differential [[id number]]  (Abnormal) Collected: 01/23/24 1104    Specimen: Blood Updated: 01/23/24 1115    Narrative:      The following orders were created for panel order CBC & Differential.  Procedure                               Abnormality         Status                     ---------                               -----------         ------                     CBC Auto Differential[[id number]]        Abnormal            Final result                 Please view results for these tests on the individual orders.    CBC Auto Differential [[id number]]  (Abnormal) Collected: 01/23/24 1104    Specimen: Blood Updated: 01/23/24 1115     WBC 15.17 10*3/mm3      RBC 5.25 10*6/mm3      Hemoglobin 16.5 g/dL      Hematocrit 45.9 %      MCV 87.4 fL      MCH 31.4 pg      MCHC 35.9 g/dL      RDW 11.8 %      RDW-SD 37.8 fl      MPV 9.7 fL      Platelets 235 10*3/mm3      Neutrophil % 78.7 %      Lymphocyte % 14.6 %      Monocyte % 5.2 %      Eosinophil % 0.6 %      Basophil % 0.3 %      Immature Grans % 0.6 %      Neutrophils, Absolute 11.94 10*3/mm3      Lymphocytes, Absolute 2.21 10*3/mm3      Monocytes, Absolute 0.79 10*3/mm3      Eosinophils, Absolute 0.09 10*3/mm3      Basophils, Absolute 0.05 10*3/mm3      Immature Grans, Absolute 0.09 10*3/mm3      nRBC 0.0 /100 WBC     Urinalysis With Microscopic If Indicated (No Culture) - Urine, Clean Catch [[id number]]  (Abnormal) Collected: 01/23/24 1104    Specimen: Urine, Clean Catch Updated: 01/23/24 1112     Color, UA Yellow     Appearance, UA Clear     pH, UA 7.5      Specific Gravity, UA 1.022     Glucose, UA Negative     Ketones, UA Negative     Bilirubin, UA Negative     Blood, UA Negative     Protein, UA 30 mg/dL (1+)     Leuk Esterase, UA Negative     Nitrite, UA Negative     Urobilinogen, UA 1.0 E.U./dL                            ASSESSMENT/PLAN:      Acute appendicitis with localized peritonitis, without perforation, abscess, or gangrene    Mr. Pineda is a 28-year-old male patient who presented with a 24-hour history of diffuse abdominal pain, and was found to have abdominal tenderness and leukocytosis suggestive of acute appendicitis.  CT imaging confirmed diagnosis, and I have recommended to the patient that we proceed with laparoscopic appendectomy for definitive management.  The surgical procedure was explained the patient in detail along with the risk, benefits, and alternatives.  We specifically discussed the risks of bleeding, infection, conversion to an open procedure, the risk of postoperative abscess formation, need for additional procedures, and the risks related to anesthesia.  He understands these, and is willing to proceed.  I am hopeful that he will be able to be successfully discharged home from the recovery room postoperatively, assuming an uncomplicated intraoperative and immediate postoperative course.  He and his family do understand the potential need for hospital admission if the intraoperative findings or immediate postoperative course dictate admission as the next best course of management.  At the conclusion of our discussion, the patient had no additional questions and was satisfied with the plan of care.  He verbalizes understanding of the surgical procedure as described to him, and was willing to proceed as discussed.  Informed consent was obtained from the patient.    Nohemi Gonzales MD  01/23/24  17:06 EST

## 2024-01-23 NOTE — ED PROVIDER NOTES
"Subjective:  History of Present Illness:    Patient is a 28-year-old male without contributing health history.  Presents to the ER today for generalized abdominal pain x 1 day. Patient reports that he has had some nausea.  Denies fever.  Denies change in bowel habit.  Denies dysuria or frequency.  Denies OTC medication or home remedy.  Denies alleviating or exacerbating factors.    Nurses Notes reviewed and agree, including vitals, allergies, social history and prior medical history.     REVIEW OF SYSTEMS: All systems reviewed and not pertinent unless noted.  Review of Systems   Gastrointestinal:  Positive for abdominal pain.   All other systems reviewed and are negative.      Past Medical History:   Diagnosis Date    Anxiety        Allergies:    Patient has no known allergies.      History reviewed. No pertinent surgical history.      Social History     Socioeconomic History    Marital status: Single   Tobacco Use    Smoking status: Some Days     Packs/day: .5     Types: Cigarettes    Smokeless tobacco: Never   Vaping Use    Vaping Use: Never used   Substance and Sexual Activity    Alcohol use: Yes     Comment: socially    Drug use: No    Sexual activity: Yes         Family History   Problem Relation Age of Onset    Hypertension Father        Objective  Physical Exam:  BP (!) 168/110 (BP Location: Left arm, Patient Position: Sitting)   Pulse 89   Temp 97.7 °F (36.5 °C) (Oral)   Resp 20   Ht 180.3 cm (71\")   Wt 127 kg (280 lb)   SpO2 99%   BMI 39.05 kg/m²      Physical Exam  Vitals and nursing note reviewed.   Constitutional:       Appearance: He is well-developed and normal weight.   HENT:      Head: Normocephalic and atraumatic.      Mouth/Throat:      Mouth: Mucous membranes are moist.      Pharynx: Oropharynx is clear.   Eyes:      Extraocular Movements: Extraocular movements intact.      Pupils: Pupils are equal, round, and reactive to light.   Cardiovascular:      Rate and Rhythm: Normal rate and " regular rhythm.      Heart sounds: Normal heart sounds.   Pulmonary:      Effort: Pulmonary effort is normal.      Breath sounds: Normal breath sounds.   Abdominal:      General: Abdomen is flat. Bowel sounds are normal.      Palpations: Abdomen is soft.      Tenderness: There is generalized abdominal tenderness.   Skin:     General: Skin is warm and dry.      Capillary Refill: Capillary refill takes less than 2 seconds.   Neurological:      General: No focal deficit present.      Mental Status: He is alert and oriented to person, place, and time.   Psychiatric:         Mood and Affect: Mood normal.         Behavior: Behavior normal.         Procedures    ED Course:         Lab Results (last 24 hours)       Procedure Component Value Units Date/Time    CBC & Differential [[id number]]  (Abnormal) Collected: 01/23/24 1104    Specimen: Blood Updated: 01/23/24 1115    Narrative:      The following orders were created for panel order CBC & Differential.  Procedure                               Abnormality         Status                     ---------                               -----------         ------                     CBC Auto Differential[[id number]]        Abnormal            Final result                 Please view results for these tests on the individual orders.    Comprehensive Metabolic Panel [[id number]]  (Abnormal) Collected: 01/23/24 1104    Specimen: Blood Updated: 01/23/24 1131     Glucose 108 mg/dL      BUN 14 mg/dL      Creatinine 0.88 mg/dL      Sodium 137 mmol/L      Potassium 4.2 mmol/L      Chloride 99 mmol/L      CO2 26.0 mmol/L      Calcium 9.2 mg/dL      Total Protein 8.5 g/dL      Albumin 4.7 g/dL      ALT (SGPT) 40 U/L      AST (SGOT) 24 U/L      Alkaline Phosphatase 81 U/L      Total Bilirubin 0.6 mg/dL      Globulin 3.8 gm/dL      A/G Ratio 1.2 g/dL      BUN/Creatinine Ratio 15.9     Anion Gap 12.0 mmol/L      eGFR 119.4 mL/min/1.73     Narrative:      GFR Normal >60  Chronic Kidney Disease  <60  Kidney Failure <15      Lipase [[id number]]  (Normal) Collected: 01/23/24 1104    Specimen: Blood Updated: 01/23/24 1131     Lipase 22 U/L     Urinalysis With Microscopic If Indicated (No Culture) - Urine, Clean Catch [[id number]]  (Abnormal) Collected: 01/23/24 1104    Specimen: Urine, Clean Catch Updated: 01/23/24 1112     Color, UA Yellow     Appearance, UA Clear     pH, UA 7.5     Specific Gravity, UA 1.022     Glucose, UA Negative     Ketones, UA Negative     Bilirubin, UA Negative     Blood, UA Negative     Protein, UA 30 mg/dL (1+)     Leuk Esterase, UA Negative     Nitrite, UA Negative     Urobilinogen, UA 1.0 E.U./dL    CBC Auto Differential [[id number]]  (Abnormal) Collected: 01/23/24 1104    Specimen: Blood Updated: 01/23/24 1115     WBC 15.17 10*3/mm3      RBC 5.25 10*6/mm3      Hemoglobin 16.5 g/dL      Hematocrit 45.9 %      MCV 87.4 fL      MCH 31.4 pg      MCHC 35.9 g/dL      RDW 11.8 %      RDW-SD 37.8 fl      MPV 9.7 fL      Platelets 235 10*3/mm3      Neutrophil % 78.7 %      Lymphocyte % 14.6 %      Monocyte % 5.2 %      Eosinophil % 0.6 %      Basophil % 0.3 %      Immature Grans % 0.6 %      Neutrophils, Absolute 11.94 10*3/mm3      Lymphocytes, Absolute 2.21 10*3/mm3      Monocytes, Absolute 0.79 10*3/mm3      Eosinophils, Absolute 0.09 10*3/mm3      Basophils, Absolute 0.05 10*3/mm3      Immature Grans, Absolute 0.09 10*3/mm3      nRBC 0.0 /100 WBC     Urinalysis, Microscopic Only - Urine, Clean Catch [[id number]]  (Abnormal) Collected: 01/23/24 1104    Specimen: Urine, Clean Catch Updated: 01/23/24 1125     RBC, UA 0-2 /HPF      WBC, UA 0-2 /HPF      Bacteria, UA Trace /HPF      Squamous Epithelial Cells, UA 0-2 /HPF      Hyaline Casts, UA None Seen /LPF      Methodology Manual Light Microscopy    Respiratory Panel PCR w/COVID-19(SARS-CoV-2) KIERAN/FAM/JASON/PAD/COR/ELY In-House, NP Swab in UTM/VTM, 2 HR TAT - Swab, Nasopharynx [[id number]] Collected: 01/23/24 1112    Specimen: Swab from  Nasopharynx Updated: 01/23/24 1116             CT Abdomen Pelvis Without Contrast    Result Date: 1/23/2024  IPROCEDURE: CT ABDOMEN PELVIS WO CONTRAST-  HISTORY: generalize abd pain (lower)  COMPARISON: None .  PROCEDURE: Axial images were obtained from the lung bases to the pubic symphysis by computed tomography. This study was performed with techniques to keep radiation doses as low as reasonably achievable, (ALARA). Individualized dose reduction techniques using automated exposure control or adjustment of mA and/or kV according to the patient size were employed.  FINDINGS:  ABDOMEN: The lung bases are clear. The heart is proper size. The limited non-contrast images of the liver are unremarkable. Gallbladder present with no CT visible stones. The spleen is unremarkable. No adrenal masses are seen. The aorta is normal in caliber. There is no significant free fluid or adenopathy.  There is no nephrolithiasis. There is no hydronephrosis.  PELVIS: The GI tract demonstrate no obstruction. The appendix is identified. Proximally there is a 11 mm appendicolith. The appendix distal to this is very dilated measuring up to 18 mm with an air-fluid level. There is infiltration of the surrounding fat. There is an adjacent mildly enlarged lymph node measuring 11 mm. Findings consistent with acute appendicitis. No free fluid identified in the pelvis. No free air seen. Prostate is normal in size. The urinary bladder is unremarkable. There is no fluid or adenopathy.      Impression: Acute appendicitis with proximal appendicolith and appendix dilated up to 18 mm; Cindy in the emergency room notified at 11:30 a.m. January 23, 2024.     CTDI: 11.86 mGy DLP:687.66 mGy.cm   This report was signed and finalized on 1/23/2024 11:32 AM by Earline Christina MD.          MDM      Initial impression of presenting illness: Patient is a 28-year-old male without contributing health history.  Presents to the ER today for generalized abdominal pain x 1  day. Patient reports that he has had some nausea.  Denies fever.  Denies change in bowel habit.  Denies dysuria or frequency.  Denies OTC medication or home remedy.  Denies alleviating or exacerbating factors.    DDX: includes but is not limited to: Colitis, diverticulitis, constipation, or other    Patient arrives stable with vitals interpreted by myself.     Pertinent features from physical exam: Lung sounds clear bilaterally throughout.  Abdomen soft mild tenderness with palpation of lower abdomen..  Bowel sounds normal.  Heart sounds normal..    Initial diagnostic plan: CBC, CMP, lipase, urinalysis, CT abdomen pelvis    Results from initial plan were reviewed and interpreted by me revealing CBC with elevation in white count.  Lipase was negative.  Urinalysis with trace bacteria CMP is normal.  CT abdomen pelvis with the following impression acute appendicitis with proximal appendicolith and appendix dilated up to 18 mm.    Diagnostic information from other sources: Chart review    Interventions / Re-evaluation: Vital signs stable throughout encounter    Results/clinical rationale were discussed with discussed results with patient and he is agreeable for surgical intervention.    Consultations/Discussion of results with other physicians: 1140 Dr. Gonzales was contacted in regards to appendicitis.  She agrees that appendix should be removed.  Patient will be slotted for next available surgical suit.    Disposition plan: Will send this patient to OR.  -----        Final diagnoses:   Appendicitis, unspecified appendicitis type          Dank Crowley, APRN  01/23/24 [id number]

## 2024-01-23 NOTE — ANESTHESIA PREPROCEDURE EVALUATION
Anesthesia Evaluation     Patient summary reviewed and Nursing notes reviewed   NPO Solid Status: > 8 hours  NPO Liquid Status: > 8 hours           Airway   Mallampati: II  TM distance: >3 FB  Neck ROM: full  Possible difficult intubation, Difficult intubation highly probable and Large neck circumference  Dental - normal exam     Pulmonary - normal exam   (+) a smoker Current,  Cardiovascular - normal exam    (+) dysrhythmias Tachycardia      Neuro/Psych  (+) psychiatric history Anxiety  GI/Hepatic/Renal/Endo    (+) obesity, morbid obesity    Musculoskeletal     Abdominal   (+) obese   Substance History   (+) alcohol use  (-) drug use     OB/GYN          Other                      Anesthesia Plan    ASA 2 - emergent     general   Rapid sequence  (Risks and benefits discussed including risk of aspiration, recall and dental damage. All patient questions answered.    Will continue with plan of care.)  intravenous induction     Anesthetic plan, risks, benefits, and alternatives have been provided, discussed and informed consent has been obtained with: patient.  Pre-procedure education provided  Plan discussed with CRNA.    CODE STATUS:

## 2024-01-25 LAB — REF LAB TEST METHOD: NORMAL

## 2024-02-01 ENCOUNTER — OFFICE VISIT (OUTPATIENT)
Dept: SURGERY | Facility: CLINIC | Age: 29
End: 2024-02-01
Payer: COMMERCIAL

## 2024-02-01 VITALS
TEMPERATURE: 98.4 F | BODY MASS INDEX: 39.2 KG/M2 | HEIGHT: 71 IN | WEIGHT: 280 LBS | DIASTOLIC BLOOD PRESSURE: 82 MMHG | OXYGEN SATURATION: 98 % | HEART RATE: 88 BPM | SYSTOLIC BLOOD PRESSURE: 136 MMHG

## 2024-02-01 DIAGNOSIS — Z90.49 S/P LAPAROSCOPIC APPENDECTOMY: Primary | ICD-10-CM

## 2024-02-01 PROCEDURE — 99024 POSTOP FOLLOW-UP VISIT: CPT | Performed by: SURGERY

## (undated) DEVICE — SXN/IRR STRYKEFLOW2 10FT

## (undated) DEVICE — SYR LL TP 10ML STRL

## (undated) DEVICE — SOL NACL 0.9PCT 1000ML

## (undated) DEVICE — SUT PDS 5/0 P3 18IN CLR Z493G

## (undated) DEVICE — TROC BLNT XCEL PLUG SLV 12X100MM

## (undated) DEVICE — GLV SURG ULTRATOUCH BIOGEL/COAT PF LF SZ6 STRL

## (undated) DEVICE — SUT VIC 0/0 UR6 27IN DYED J603H

## (undated) DEVICE — DRSNG SURESITE WNDW 4X4.5

## (undated) DEVICE — NDL HYPO ECLPS SFTY 22G 1 1/2IN

## (undated) DEVICE — GLV SURG SENSICARE GREEN W/ALOE PF LF 6 STRL

## (undated) DEVICE — TROC BLADLES XCEL/OPTI SLV THRD 5X100

## (undated) DEVICE — TRY CATH UROMETER SIL 16F

## (undated) DEVICE — SOL IRR NACL 0.9PCT BT 1000ML

## (undated) DEVICE — STPLR ENDO ECHELON FLX PWR PLS STD 45MM

## (undated) DEVICE — SPNG GZ STRL 2S 4X4 12PLY

## (undated) DEVICE — SLV SCD CALF HEMOFORCE DVT THERP REPROC MD

## (undated) DEVICE — ADHS LIQ MASTISOL 2/3ML

## (undated) DEVICE — BNDG ADHS PLSTC 1X3IN LF

## (undated) DEVICE — PK LAP GEN 20

## (undated) DEVICE — SLV TROC ENDOPATHXCEL THRD 5X100MM CB5LT

## (undated) DEVICE — RETRV BG SPECI ENDOPOUCH

## (undated) DEVICE — STPLR ENDO CUT COMPCT W/KNIFE ATS45